# Patient Record
Sex: FEMALE | Race: WHITE | Employment: OTHER | URBAN - METROPOLITAN AREA
[De-identification: names, ages, dates, MRNs, and addresses within clinical notes are randomized per-mention and may not be internally consistent; named-entity substitution may affect disease eponyms.]

---

## 2017-01-17 PROBLEM — R03.0 ELEVATED BLOOD PRESSURE READING WITHOUT DIAGNOSIS OF HYPERTENSION: Status: ACTIVE | Noted: 2017-01-17

## 2017-06-15 PROBLEM — I10 ESSENTIAL HYPERTENSION: Status: ACTIVE | Noted: 2017-01-17

## 2017-06-15 PROBLEM — R01.1 SYSTOLIC MURMUR: Status: ACTIVE | Noted: 2017-06-15

## 2017-07-27 PROBLEM — I51.7 LAE (LEFT ATRIAL ENLARGEMENT): Status: ACTIVE | Noted: 2017-07-27

## 2017-07-27 PROBLEM — I34.81 MITRAL VALVE ANNULAR CALCIFICATION: Status: ACTIVE | Noted: 2017-07-27

## 2017-09-08 ENCOUNTER — HOSPITAL ENCOUNTER (OUTPATIENT)
Dept: LAB | Age: 82
Discharge: HOME | End: 2017-09-08
Payer: MEDICARE

## 2017-09-08 DIAGNOSIS — I05.0 SEVERE MITRAL VALVE STENOSIS: ICD-10-CM

## 2017-09-08 DIAGNOSIS — I34.81 MITRAL VALVE ANNULAR CALCIFICATION: ICD-10-CM

## 2017-09-08 DIAGNOSIS — I10 ESSENTIAL HYPERTENSION: ICD-10-CM

## 2017-09-08 DIAGNOSIS — I51.7 LAE (LEFT ATRIAL ENLARGEMENT): ICD-10-CM

## 2017-09-08 LAB
ALBUMIN SERPL-MCNC: 4 G/DL (ref 3.2–4.6)
ALBUMIN/GLOB SERPL: 1.1 {RATIO}
ALP SERPL-CCNC: 58 U/L (ref 50–136)
ALT SERPL-CCNC: 16 U/L (ref 12–65)
ANION GAP SERPL CALC-SCNC: 7 MMOL/L
AST SERPL-CCNC: 16 U/L (ref 15–37)
BASOPHILS # BLD: 0 K/UL (ref 0–0.2)
BASOPHILS NFR BLD: 0 % (ref 0–2)
BILIRUB SERPL-MCNC: 0.4 MG/DL (ref 0.2–1.1)
BUN SERPL-MCNC: 42 MG/DL (ref 8–23)
CALCIUM SERPL-MCNC: 9.2 MG/DL (ref 8.3–10.4)
CHLORIDE SERPL-SCNC: 105 MMOL/L (ref 98–107)
CHOLEST SERPL-MCNC: 216 MG/DL
CO2 SERPL-SCNC: 28 MMOL/L (ref 21–32)
CREAT SERPL-MCNC: 1.5 MG/DL (ref 0.6–1)
DIFFERENTIAL METHOD BLD: ABNORMAL
EOSINOPHIL # BLD: 0.2 K/UL (ref 0–0.8)
EOSINOPHIL NFR BLD: 2 % (ref 0.5–7.8)
ERYTHROCYTE [DISTWIDTH] IN BLOOD BY AUTOMATED COUNT: 13 % (ref 11.9–14.6)
GLOBULIN SER CALC-MCNC: 3.7 G/DL
GLUCOSE SERPL-MCNC: 112 MG/DL (ref 65–100)
HCT VFR BLD AUTO: 36 % (ref 35.8–46.3)
HDLC SERPL-MCNC: 51 MG/DL (ref 40–60)
HDLC SERPL: 4.2 {RATIO}
HGB BLD-MCNC: 11.8 G/DL (ref 11.7–15.4)
LDLC SERPL CALC-MCNC: 132.6 MG/DL
LIPID PROFILE,FLP: ABNORMAL
LYMPHOCYTES # BLD: 1.3 K/UL (ref 0.5–4.6)
LYMPHOCYTES NFR BLD: 16 % (ref 13–44)
MCH RBC QN AUTO: 31.9 PG (ref 26.1–32.9)
MCHC RBC AUTO-ENTMCNC: 32.8 G/DL (ref 31.4–35)
MCV RBC AUTO: 97.3 FL (ref 79.6–97.8)
MONOCYTES # BLD: 0.7 K/UL (ref 0.1–1.3)
MONOCYTES NFR BLD: 9 % (ref 4–12)
NEUTS SEG # BLD: 6.1 K/UL (ref 1.7–8.2)
NEUTS SEG NFR BLD: 73 % (ref 43–78)
PLATELET # BLD AUTO: 230 K/UL (ref 150–450)
PMV BLD AUTO: 8.9 FL (ref 10.8–14.1)
POTASSIUM SERPL-SCNC: 4.7 MMOL/L (ref 3.5–5.1)
PROT SERPL-MCNC: 7.7 G/DL (ref 6.3–8.2)
RBC # BLD AUTO: 3.7 M/UL (ref 4.05–5.25)
SODIUM SERPL-SCNC: 140 MMOL/L (ref 136–145)
TRIGL SERPL-MCNC: 162 MG/DL (ref 35–150)
TSH SERPL DL<=0.005 MIU/L-ACNC: 2.87 UIU/ML (ref 0.36–3.74)
VLDLC SERPL CALC-MCNC: 32.4 MG/DL (ref 6–23)
WBC # BLD AUTO: 8.3 K/UL (ref 4.3–11.1)

## 2017-09-08 PROCEDURE — 84443 ASSAY THYROID STIM HORMONE: CPT | Performed by: INTERNAL MEDICINE

## 2017-09-08 PROCEDURE — 36415 COLL VENOUS BLD VENIPUNCTURE: CPT | Performed by: INTERNAL MEDICINE

## 2017-09-08 PROCEDURE — 85025 COMPLETE CBC W/AUTO DIFF WBC: CPT | Performed by: INTERNAL MEDICINE

## 2017-09-08 PROCEDURE — 80061 LIPID PANEL: CPT | Performed by: INTERNAL MEDICINE

## 2017-09-08 PROCEDURE — 80053 COMPREHEN METABOLIC PANEL: CPT | Performed by: INTERNAL MEDICINE

## 2017-09-14 NOTE — PROGRESS NOTES
Patient pre-assessment complete for [facility] with Dr Vicky Savage scheduled for 9/15/17 at 9:30am, arrival time 6:30am - HYDRATION. Patient verified using . Patient instructed to bring all home medications in labeled bottles on the day of procedure. NPO status reinforced. Patient informed to take a full dose aspirin 325mg  or 81 mg x 4 on the day of procedure. Patient instructed to HOLD lisinopril/HCTZ. Instructed they can take all other medications excluding vitamins & supplements. Patient verbalizes understanding of all instructions & denies any questions at this time.

## 2017-09-15 ENCOUNTER — HOSPITAL ENCOUNTER (OUTPATIENT)
Dept: CARDIAC CATH/INVASIVE PROCEDURES | Age: 82
Discharge: HOME | End: 2017-09-15
Attending: INTERNAL MEDICINE | Admitting: INTERNAL MEDICINE
Payer: MEDICARE

## 2017-09-15 VITALS
RESPIRATION RATE: 26 BRPM | BODY MASS INDEX: 29.59 KG/M2 | OXYGEN SATURATION: 98 % | WEIGHT: 167 LBS | DIASTOLIC BLOOD PRESSURE: 94 MMHG | SYSTOLIC BLOOD PRESSURE: 161 MMHG | HEART RATE: 62 BPM | HEIGHT: 63 IN

## 2017-09-15 LAB
ANION GAP SERPL CALC-SCNC: 9 MMOL/L (ref 7–16)
BUN SERPL-MCNC: 31 MG/DL (ref 8–23)
CALCIUM SERPL-MCNC: 9.4 MG/DL (ref 8.3–10.4)
CHLORIDE SERPL-SCNC: 107 MMOL/L (ref 98–107)
CO2 SERPL-SCNC: 24 MMOL/L (ref 21–32)
CREAT SERPL-MCNC: 1.17 MG/DL (ref 0.6–1)
GLUCOSE SERPL-MCNC: 107 MG/DL (ref 65–100)
POTASSIUM SERPL-SCNC: 4.3 MMOL/L (ref 3.5–5.1)
SODIUM SERPL-SCNC: 140 MMOL/L (ref 136–145)

## 2017-09-15 PROCEDURE — 74011000250 HC RX REV CODE- 250: Performed by: INTERNAL MEDICINE

## 2017-09-15 PROCEDURE — 74011636320 HC RX REV CODE- 636/320: Performed by: INTERNAL MEDICINE

## 2017-09-15 PROCEDURE — 77030004534 HC CATH ANGI DX INFN CARD -A

## 2017-09-15 PROCEDURE — 74011250636 HC RX REV CODE- 250/636

## 2017-09-15 PROCEDURE — 77030015766

## 2017-09-15 PROCEDURE — 99153 MOD SED SAME PHYS/QHP EA: CPT

## 2017-09-15 PROCEDURE — C1894 INTRO/SHEATH, NON-LASER: HCPCS

## 2017-09-15 PROCEDURE — 80048 BASIC METABOLIC PNL TOTAL CA: CPT | Performed by: INTERNAL MEDICINE

## 2017-09-15 PROCEDURE — C1769 GUIDE WIRE: HCPCS

## 2017-09-15 PROCEDURE — 99152 MOD SED SAME PHYS/QHP 5/>YRS: CPT

## 2017-09-15 PROCEDURE — 74011250636 HC RX REV CODE- 250/636: Performed by: INTERNAL MEDICINE

## 2017-09-15 PROCEDURE — C1751 CATH, INF, PER/CENT/MIDLINE: HCPCS

## 2017-09-15 PROCEDURE — 74011000258 HC RX REV CODE- 258: Performed by: INTERNAL MEDICINE

## 2017-09-15 PROCEDURE — 77030029997 HC DEV COM RDL R BND TELE -B

## 2017-09-15 PROCEDURE — 93460 R&L HRT ART/VENTRICLE ANGIO: CPT

## 2017-09-15 RX ORDER — DIAZEPAM 5 MG/1
5 TABLET ORAL ONCE
Status: DISCONTINUED | OUTPATIENT
Start: 2017-09-15 | End: 2017-09-15 | Stop reason: HOSPADM

## 2017-09-15 RX ORDER — GUAIFENESIN 100 MG/5ML
81-324 LIQUID (ML) ORAL ONCE
Status: DISCONTINUED | OUTPATIENT
Start: 2017-09-15 | End: 2017-09-15 | Stop reason: HOSPADM

## 2017-09-15 RX ORDER — SODIUM CHLORIDE 9 MG/ML
1 INJECTION, SOLUTION INTRAVENOUS AS NEEDED
Status: DISCONTINUED | OUTPATIENT
Start: 2017-09-15 | End: 2017-09-15 | Stop reason: HOSPADM

## 2017-09-15 RX ORDER — LIDOCAINE HYDROCHLORIDE 20 MG/ML
1-40 INJECTION, SOLUTION INFILTRATION; PERINEURAL
Status: DISCONTINUED | OUTPATIENT
Start: 2017-09-15 | End: 2017-09-15 | Stop reason: HOSPADM

## 2017-09-15 RX ORDER — FENTANYL CITRATE 50 UG/ML
25-200 INJECTION, SOLUTION INTRAMUSCULAR; INTRAVENOUS
Status: DISCONTINUED | OUTPATIENT
Start: 2017-09-15 | End: 2017-09-15 | Stop reason: HOSPADM

## 2017-09-15 RX ORDER — SODIUM CHLORIDE 9 MG/ML
3 INJECTION, SOLUTION INTRAVENOUS ONCE
Status: COMPLETED | OUTPATIENT
Start: 2017-09-15 | End: 2017-09-15

## 2017-09-15 RX ORDER — MIDAZOLAM HYDROCHLORIDE 1 MG/ML
.5-5 INJECTION, SOLUTION INTRAMUSCULAR; INTRAVENOUS
Status: DISCONTINUED | OUTPATIENT
Start: 2017-09-15 | End: 2017-09-15 | Stop reason: HOSPADM

## 2017-09-15 RX ORDER — SODIUM CHLORIDE 9 MG/ML
75 INJECTION, SOLUTION INTRAVENOUS CONTINUOUS
Status: DISCONTINUED | OUTPATIENT
Start: 2017-09-15 | End: 2017-09-15

## 2017-09-15 RX ORDER — HEPARIN SODIUM 200 [USP'U]/100ML
3 INJECTION, SOLUTION INTRAVENOUS CONTINUOUS
Status: DISCONTINUED | OUTPATIENT
Start: 2017-09-15 | End: 2017-09-15 | Stop reason: HOSPADM

## 2017-09-15 RX ADMIN — LIDOCAINE HYDROCHLORIDE 100 MG: 20 INJECTION, SOLUTION INFILTRATION; PERINEURAL at 09:48

## 2017-09-15 RX ADMIN — FENTANYL CITRATE 25 MCG: 50 INJECTION, SOLUTION INTRAMUSCULAR; INTRAVENOUS at 10:38

## 2017-09-15 RX ADMIN — MIDAZOLAM HYDROCHLORIDE 1 MG: 1 INJECTION, SOLUTION INTRAMUSCULAR; INTRAVENOUS at 09:55

## 2017-09-15 RX ADMIN — FENTANYL CITRATE 25 MCG: 50 INJECTION, SOLUTION INTRAMUSCULAR; INTRAVENOUS at 09:55

## 2017-09-15 RX ADMIN — IOPAMIDOL 90 ML: 755 INJECTION, SOLUTION INTRAVENOUS at 10:54

## 2017-09-15 RX ADMIN — MIDAZOLAM HYDROCHLORIDE 1 MG: 1 INJECTION, SOLUTION INTRAMUSCULAR; INTRAVENOUS at 10:38

## 2017-09-15 RX ADMIN — VERAPAMIL HYDROCHLORIDE 2 ML: 2.5 INJECTION INTRAVENOUS at 09:48

## 2017-09-15 RX ADMIN — HEPARIN SODIUM 3 ML/HR: 200 INJECTION, SOLUTION INTRAVENOUS at 10:00

## 2017-09-15 RX ADMIN — SODIUM CHLORIDE 3 ML/KG/HR: 900 INJECTION, SOLUTION INTRAVENOUS at 08:00

## 2017-09-15 NOTE — IP AVS SNAPSHOT
[address] 
[phone] Patient: Jeremiah Garcia MRN: [id number] KRT:8[birth year] Discharge Summary 9/15/2017 Jeremiah Garcia MRN[de-identified]  [id number] Admission Information Provider Pager Service Admission Date Expected D/C Date Jade Barber MD  CARDIAC CATH LAB 9/15/2017 Actual LOS Patient Class 0 days OUTPATIENT Follow-up Information None Current Discharge Medication List  
  
ASK your doctor about these medications Dose & Instructions Dispensing Information Comments Morning Noon Evening Bedtime CALCIUM 600 WITH VITAMIN D3 600 mg(1,500mg) -400 unit Cap Generic drug:  Calcium-Cholecalciferol (D3) Your last dose was: Your next dose is:    
   
   
 Dose:  1 Cap Take 1 Cap by mouth two (2) times a day. Refills:  0  
     
   
   
   
  
 colestipol 1 gram tablet Commonly known as:  COLESTID Your last dose was: Your next dose is:    
   
   
 Dose:  1 g Take 1 Tab by mouth daily. \"NAME ALERT\" Quantity:  90 Tab Refills:  3 FISH OIL PO Your last dose was: Your next dose is: Take  by mouth daily. Refills:  0 FORMULA MULTIVIT & MINERAL PO Your last dose was: Your next dose is: Take  by mouth daily. Refills:  0  
     
   
   
   
  
 garlic Cap Your last dose was: Your next dose is: Take  by mouth daily. Refills:  0  
     
   
   
   
  
 levothyroxine 75 mcg tablet Commonly known as:  SYNTHROID Your last dose was: Your next dose is:    
   
   
 Dose:  75 mcg Take 1 Tab by mouth daily. Generic OK, \"NAME ALERT\"  Indications: hypothyroidism Quantity:  90 Tab Refills:  3  
     
   
   
   
  
 lisinopril-hydroCHLOROthiazide 20-12.5 mg per tablet Commonly known as:  Rocky Goss Your last dose was: Your next dose is:    
   
   
 Dose:  1 Tab Take 1 Tab by mouth daily. Quantity:  90 Tab Refills:  1  
 +++NAME ALERT+++ OTHER Your last dose was: Your next dose is: OTC memory tablet- qd Refills:  0  
     
   
   
   
  
 TYLENOL 325 mg tablet Generic drug:  acetaminophen Your last dose was: Your next dose is:    
   
   
 Dose:  650 mg  
take 650 mg by mouth every four (4) hours as needed for Pain. Refills:  0  
     
   
   
   
  
 VITAMIN B-12 500 mcg tablet Generic drug:  cyanocobalamin Your last dose was: Your next dose is:    
   
   
 Dose:  500 mcg  
take 500 mcg by mouth daily. Refills:  0 General Information Please provide this summary of care documentation to your next provider. Allergies Unspecified:  Lipitor [Atorvastatin]; Penicillin G  
  
Current Immunizations  Reviewed on 10/27/2016 Name Date Influenza High Dose Vaccine PF 10/27/2016, 10/16/2015 Pneumococcal Conjugate (PCV-13) 10/27/2016 Discharge Instructions Discharge Instructions Appointment with Dr. Catia Sorto on Tuesday September 19th at 3:20 pm at [address] at [address] [facility] [address] 120. HEART CATHETERIZATION/ANGIOGRAPHY DISCHARGE INSTRUCTIONS 1. Check puncture site frequently for swelling or bleeding. If there is any bleeding, lie down and apply pressure over the area with a clean towel or washcloth and call 911. Notify your doctor for any redness, swelling, drainage, or oozing from the puncture site. Notify your doctor for any fever or chills. 2. If the extremity becomes cold, numb, or painful call Dr. Krystal Kam at ***. 3. Activity should be limited for the next 48 hours. Avoid pushing, pulling and bending of affected wrist for 48 hours.  No heavy lifting (anything over 5 pounds) for 3 days. No driving for 48 hours. 4. You may resume your usual diet. Drink more fluids than usual. 
5. Have a responsible person drive you home and stay with you for at least 24 hours after your heart catheterization/angiography. 6. You may remove bandage from your *** arm  in 24 hours. You may shower in 24 hours. No tub baths, hot tubs, or swimming for 1 week. Do not place any lotions, creams, powders, or ointments over puncture site for 1 week. You may place a clean band-aid over the puncture site each day for 5 days. Change daily. I have read the above instructions and have had the opportunity to ask questions. Discharge Orders None  
  
` Patient Signature:  ____________________________________________________________ Date:  ____________________________________________________________  
  
 Fayrene Retort Provider Signature:  ____________________________________________________________ Date:  ____________________________________________________________

## 2017-09-15 NOTE — PROGRESS NOTES
Patient received to [address] [place] [address] room # 6  Ambulatory from [facility]. Patient scheduled for [facility] today with Dr Sara rAnold. Procedure reviewed & questions answered, voiced good understanding consent obtained & placed on chart. All medications and medical history reviewed. Will prep patient per orders. Patient & family updated on plan of care.

## 2017-09-15 NOTE — PROCEDURES
Cardiac Catheterization Procedure Note    Patient ID:     Name: Bartolome Vences   Medical Record Number: [id number]   YOB: 1935    Date of Procedure: 9/15/2017    Physician: Vincent Roberson MD    [facility]    Indications: This is a 80 yrs female who presents with MS. Blood loss less than 5 ml    Sedation. Pt received 1 mg versed and 25 mcg fentanyl for monitored conscious sedation in 2 15 minute intervals. Specimen: None    No complications    No assistants    Time out, Mallampati, and ASA performed    Procedure:  After informed consent, patient was prepped and draped in the usual sterile fashion. The right wrist was infiltrated with lidocaine. The right radial artery was accessed via the modified Seldinger technique with a 6 Malay sheath. 100cc Visipaque contrast were utilized for the entire procedure. no closure device used    Catheters.       FINDINGS    Left Ventricle: 65%  LVEDP: 15    Left Main:calcified    Left Anterior descending coronary artery: 95% mid severe stenosis    Left Circumflex coronary artery: ok    Right coronary artery: 100%prox occlusion      Graft anatomy: na    Intervention if done: na    Conclusions: severe cad with severe mitral stenosis valve are 1.4 mean gradient 9.45    Recommentations: cabg mvr    No complications      Signed By: Vincent Roberson MD

## 2017-09-15 NOTE — PROGRESS NOTES
Patient up to bedside, vital signs and site stable. Patient ambulated to bathroom without difficulty. Patient voided without difficulty. Vascular site stable. 1335 Discharge instructions and home medications reviewed with patient. Time allowed for questions and answers. 1340 Patient ambulated second time without difficulty. Site stable after ambulation. Peripheral IV sites dc'd without difficulty with tips intact. 1345 Patient discharged to home with family.

## 2017-09-15 NOTE — PROCEDURES
Lien Posey 44       Name:  Shasha Lyle   MR#:  [id number]   :  1935   Account #:  [de-identified]   Date of Adm:  09/15/2017       PROCEDURE: Right and left heart catheterization with dual   transducers for mitral stenosis. INDICATIONS: Mitral stenosis, shortness of breath, angina. COMPLICATIONS: None. ACCESS: Right radial and right brachial access were used, 1 for   the artery, 1 for the vein. A tig4 and pigtail were used. Mardeen Bronx   was used. FINDINGS ARE AS FOLLOWS:    HEMODYNAMICS: Right atrial A wave of 11, V wave 8, mean of 7. RA   pressure systolic 43, diastolic 3, mean of 7, PA pressure   systolic 44, diastolic 16, mean of 24. Pulmonary capillary wedge pressure A wave 17, V-wave 23, mean of   16. LV pressure 133/8 with an LVEDP of 10. Aortic pressure 132/52. Cardiac output 5.85. Calculated mitral valve area is 1.4 cm2 with a mean gradient of   9.45 mmHg. FINDINGS: Left ventriculogram done in ROWE projection shows EF   60% to 65%. No gradient on pullback. The left main arises normally, bifurcates into an LAD and   circumflex. The left main is calcified, but not stenosed. The LAD courses to the apex. The proximal LAD is severely   diseased with 90% to 95% stenosis followed by post-stenotic   aneurysmal dilatation, then the LAD becomes a normal caliber   with no significant disease to the apex. The circumflex artery in the AV groove, supplies 2 OMs, it   appears to be smooth and relatively spared of any disease. The right coronary artery arises in the normal fashion and has a   chronic occlusion in the proximal third of the artery. The right   fills with right to right collaterals. CONCLUSIONS: Severe multivessel coronary artery disease with   preserved left ventricular systolic function. Severe mitral   annular calcification with severe mitral stenosis and a valve   area of approximately 1.4 cm.  The patient has a mean gradient of   9.45, a Peak gradient of between 25 and 27 mmHg. Consideration   for valve CABG will be given.         MD CIRO Rojo / Helder Reid   D:  09/15/2017   11:32   T:  09/15/2017   11:46   Job #:  [id number]

## 2017-09-15 NOTE — PROGRESS NOTES
Report received from [address]. Procedural findings communicated. Intra procedural  medication administration reviewed. Progression of care discussed. Patient received into [address] [facility] 6 post procedure.      Sheath site without bleeding or swelling yes    Dressing dry and intact yes    Patient instructed to limit movement to right upper extremity    Routine post procedural vital signs and site assessment initiated yes

## 2017-09-15 NOTE — DISCHARGE INSTRUCTIONS
Appointment with Dr. Annabelle Reyes on Tuesday September 19th at 3:20 pm at [address] at [address] [place] [address]. HEART CATHETERIZATION/ANGIOGRAPHY DISCHARGE INSTRUCTIONS    1. Check puncture site frequently for swelling or bleeding. If there is any bleeding, lie down and apply pressure over the area with a clean towel or washcloth and call 911. Notify your doctor for any redness, swelling, drainage, or oozing from the puncture site. Notify your doctor for any fever or chills. 2. If the extremity becomes cold, numb, or painful call Dr. Birt Dakins at ***. 3. Activity should be limited for the next 48 hours. Avoid pushing, pulling and bending of affected wrist for 48 hours. No heavy lifting (anything over 5 pounds) for 3 days. No driving for 48 hours. 4. You may resume your usual diet. Drink more fluids than usual.  5. Have a responsible person drive you home and stay with you for at least 24 hours after your heart catheterization/angiography. 6. You may remove bandage from your *** arm  in 24 hours. You may shower in 24 hours. No tub baths, hot tubs, or swimming for 1 week. Do not place any lotions, creams, powders, or ointments over puncture site for 1 week. You may place a clean band-aid over the puncture site each day for 5 days. Change daily. I have read the above instructions and have had the opportunity to ask questions.

## 2017-09-15 NOTE — H&P
Progress Notes  Encounter Date: 9/8/2017  Michael Estrella MD   Cardiology   Expand All Collapse All    []Hide copied text  []Hover for attribution information  [address] [place], [place]  [address] [place] [address], [address] [place] [address]  PHONE: [phone]     SUBJECTIVE: Amilcar Garcia (1935) is a 80 y.o. female seen for a follow up visit regarding the following:           ICD-10-CM ICD-9-CM   1. Essential hypertension I10 401.9   2. LAE (left atrial enlargement)-Severe I51.7 429.3   3. Mitral valve annular calcification I05.9 424.0   4. Severe mitral valve stenosis I05.0 394.0      Pt here for new murmur. She is sob jack getting worse class 2 symptoms. No pnd. Some mild cp. Not exertional related. Echo shows severe mitral stenosis        Past Medical History, Past Surgical History, Family history, Social History, and Medications were all reviewed with the patient today and updated as necessary.            Outpatient Prescriptions Marked as Taking for the 9/8/17 encounter (Office Visit) with Michael Estrella MD   Medication Sig Dispense Refill    Calcium-Cholecalciferol, D3, (CALCIUM 600 WITH VITAMIN D3) 600 mg(1,500mg) -400 unit cap Take 1 Cap by mouth two (2) times a day.        OTHER OTC memory tablet- qd        lisinopril-hydroCHLOROthiazide (PRINZIDE, ZESTORETIC) 20-12.5 mg per tablet Take 1 Tab by mouth daily. 90 Tab 1    levothyroxine (SYNTHROID) 75 mcg tablet Take 1 Tab by mouth daily. Generic OK, \"NAME ALERT\"  Indications: hypothyroidism 90 Tab 3    colestipol (COLESTID) 1 gram tablet Take 1 Tab by mouth daily.  \"NAME ALERT\" 90 Tab 3    TYLENOL 325 mg Tab take 650 mg by mouth every four (4) hours as needed for Pain.         garlic Cap take by mouth.         FISH OIL PO take by mouth.         VITAMIN B-12 500 mcg Tab take 500 mcg by mouth daily.                 Allergies   Allergen Reactions    Lipitor [Atorvastatin] Other (comments)       Muscle aches    Penicillin G Rash           Past Medical History:   Diagnosis Date    Endocrine disease       hypothyroid    Gastrointestinal disorder       hiatal hernia    Osteoarthrosis, unspecified whether generalized or localized, shoulder region 7/21/2015            Past Surgical History:   Procedure Laterality Date    ABDOMEN SURGERY PROC UNLISTED         cystocele/ rectocele    HX GYN         hysterectomy    HX HEENT         yolanda.  cataract    HX ORTHOPAEDIC         right ankle pin             Family History   Problem Relation Age of Onset    Cancer Mother         breast    Cancer Father         lung           Social History   Substance Use Topics    Smoking status: Never Smoker    Smokeless tobacco: Never Used    Alcohol use No         ROS:  Review of Systems - General ROS: negative for - chills, fatigue or fever  Hematological and Lymphatic ROS: negative for - bleeding problems, bruising or jaundice  Respiratory ROS: positive for - shortness of breath  Cardiovascular ROS: positive for - chest pain  Gastrointestinal ROS: no abdominal pain, change in bowel habits, or black or bloody stools  Neurological ROS: no TIA or stroke symptoms  All other systems negative.        PHYSICAL EXAM:       Visit Vitals    /60    Pulse 64    Ht 5' 3\" (1.6 m)    Wt 167 lb (75.8 kg)    BMI 29.58 kg/m2         Physical Examination: General appearance - alert, well appearing, and in no distress  Mental status - alert, oriented to person, place, and time  Eyes - pupils equal and reactive, extraocular eye movements intact  Neck/lymph - supple, no significant adenopathy  Chest/lungs - clear to auscultation, no wheezes, rales or rhonchi, symmetric air entry  Heart/CV - normal rate, regular rhythm, normal S1, S2, no murmurs, rubs, clicks or gallops, diastolic murmur 2/6 at 2nd left intercostal space  Abdomen/GI - soft, nontender, nondistended, no masses or organomegaly  Musculoskeletal - no joint tenderness, deformity or swelling  Extremities - peripheral pulses normal, no pedal edema, no clubbing or cyanosis  Skin - normal coloration and turgor, no rashes, no suspicious skin lesions noted     EKG review nsr      Recent Results    No results found for this or any previous visit (from the past 672 hour(s)). Lab Results   Component Value Date/Time     Cholesterol, total 196 05/18/2017 09:19 AM     HDL Cholesterol 55 05/18/2017 09:19 AM     LDL, calculated 123 05/18/2017 09:19 AM     VLDL, calculated 18 05/18/2017 09:19 AM     Triglyceride 88 05/18/2017 09:19 AM            ASSESSMENT and PLAN           1. Essential hypertension  The current medical regimen is effective;  continue present plan and medications.     - AMB POC EKG ROUTINE W/ 12 LEADS, INTER & REP     2. LAE (left atrial enlargement)-Severe  The current medical regimen is effective;  continue present plan and medications.        3. Mitral valve annular calcification  The current medical regimen is effective;  continue present plan and medications.        4. Severe mitral valve stenosis  The current medical regimen is effective;  continue present plan and medications.                       Orders Placed This Encounter    AMB POC EKG ROUTINE W/ 12 LEADS, INTER & REP       Order Specific Question:   Reason for Exam:       Answer:   See diagnosis    Calcium-Cholecalciferol, D3, (CALCIUM 600 WITH VITAMIN D3) 600 mg(1,500mg) -400 unit cap       Sig: Take 1 Cap by mouth two (2) times a day.     OTHER       Sig: OTC memory tablet- qd         Follow-up Disposition: Not on File     Needs heart cath prob dual transducer for mitral stenosis        Tab Pollard MD  9/8/2017  9:36 AM            Electronically signed by Tab Pollard MD at 09/08/17 2235        Office Visit on 9/8/2017              Routing History              Detailed Report             Note shared with patient   Note Details   Author Tab Pollard MD File Time 09/08/17 5786   Author Type Physician Status Signed   Last  Tab Pollard MD Specialty Cardiology

## 2017-09-15 NOTE — PROGRESS NOTES
7FR sheath removed from right brachial vein. Manual pressure held for 15 minutes until hemostasis achieved. No bleeding or hematoma noted afterwards. Sterile dressing placed. Pt instructed to keep right arm straight and to remain flat. Pt verbalized understanding of post procedural instructions. Call bell within reach. Will continue to monitor. Hemostasis achieved at 1135.

## 2017-09-15 NOTE — PROGRESS NOTES
TRANSFER - OUT REPORT:    Verbal report given to Vladimir Harris RN(name) on Jose Francisco Garcia  being transferred to CPRU(unit) for routine progression of care       Report consisted of patients Situation, Background, Assessment and   Recommendations(SBAR). Information from the following report(s) SBAR and Procedure Summary was reviewed with the receiving nurse. Opportunity for questions and clarification was provided. Procedure: LHC/RHC   Finding Summary: Diagnostic, surgical consult(cath/pci/pacer settings)  Location: RRA/RBV    Closure Device: RRA with R band, 12 ml of air at 1058, RBV with 7fr sheath in place(yes/no/description)  Post Site Assessment: no oozing or hematoma, verified with Fareed George RN     Pre Procedure Meds:(if any)    ASA: 324 mg  When Received:  1936  Antiplatelet: n/a    Intra Procedure Meds:    Versed: 2 mg  Fentanyl: 50 mcg  Heparin: 2000 units               Peripheral IV 09/15/17 Right Antecubital (Active)       Peripheral IV 09/15/17 Left Antecubital (Active)     Sheath 09/15/17 (Active)   Site Assessment Clean, dry, & intact 9/15/2017 10:57 AM   Dressing Status Occlusive 9/15/2017 10:57 AM   Dressing Type Transparent 9/15/2017 10:57 AM   Hub Color/Line Status Orange 9/15/2017 10:57 AM     Post-Procedure Site Assessment (1)  Wound Type: Catheter entry/exit  Location: Radial  Orientation : Right  Closure Device:  (R band with 10 ml of air at 1058)  Site Assessment: Dry/intact                       is allergic to lipitor [atorvastatin] and penicillin g.     Past Medical History:   Diagnosis Date    Endocrine disease     hypothyroid    Essential hypertension     Gastrointestinal disorder     hiatal hernia    Osteoarthrosis, unspecified whether generalized or localized, shoulder region 7/21/2015     Visit Vitals    /76    Pulse 67    Resp 18    Ht 5' 3\" (1.6 m)    Wt 75.8 kg (167 lb)    SpO2 98%    Breastfeeding No    BMI 29.58 kg/m2

## 2017-09-21 NOTE — PROGRESS NOTES
Patient pre-assessment complete for [facility] with DR Reyes Francis scheduled for 17 at 9am, arrival time 7am. Patient verified using . Patient instructed to bring all home medications in labeled bottles on the day of procedure. NPO status reinforced. Patient instructed to HOLD lisinopril/hctz. Instructed they can take all other medications excluding vitamins & supplements. Patient verbalizes understanding of all instructions & denies any questions at this time.

## 2017-09-22 ENCOUNTER — HOSPITAL ENCOUNTER (OUTPATIENT)
Dept: CARDIAC CATH/INVASIVE PROCEDURES | Age: 82
Discharge: HOME | End: 2017-09-22
Attending: INTERNAL MEDICINE | Admitting: INTERNAL MEDICINE
Payer: MEDICARE

## 2017-09-22 VITALS
HEART RATE: 57 BPM | OXYGEN SATURATION: 90 % | RESPIRATION RATE: 16 BRPM | BODY MASS INDEX: 29.59 KG/M2 | TEMPERATURE: 98.6 F | DIASTOLIC BLOOD PRESSURE: 75 MMHG | WEIGHT: 167 LBS | HEIGHT: 63 IN | SYSTOLIC BLOOD PRESSURE: 162 MMHG

## 2017-09-22 LAB
ANION GAP SERPL CALC-SCNC: 10 MMOL/L (ref 7–16)
ATRIAL RATE: 62 BPM
BUN SERPL-MCNC: 29 MG/DL (ref 8–23)
CALCIUM SERPL-MCNC: 9.4 MG/DL (ref 8.3–10.4)
CALCULATED P AXIS, ECG09: 95 DEGREES
CALCULATED R AXIS, ECG10: 45 DEGREES
CALCULATED T AXIS, ECG11: 52 DEGREES
CHLORIDE SERPL-SCNC: 107 MMOL/L (ref 98–107)
CO2 SERPL-SCNC: 25 MMOL/L (ref 21–32)
CREAT SERPL-MCNC: 1.02 MG/DL (ref 0.6–1)
DIAGNOSIS, 93000: NORMAL
ERYTHROCYTE [DISTWIDTH] IN BLOOD BY AUTOMATED COUNT: 13.1 % (ref 11.9–14.6)
GLUCOSE SERPL-MCNC: 115 MG/DL (ref 65–100)
HCT VFR BLD AUTO: 34 % (ref 35.8–46.3)
HGB BLD-MCNC: 11.4 G/DL (ref 11.7–15.4)
INR PPP: 1 (ref 0.9–1.2)
MCH RBC QN AUTO: 31.1 PG (ref 26.1–32.9)
MCHC RBC AUTO-ENTMCNC: 33.5 G/DL (ref 31.4–35)
MCV RBC AUTO: 92.9 FL (ref 79.6–97.8)
P-R INTERVAL, ECG05: 196 MS
PLATELET # BLD AUTO: 218 K/UL (ref 150–450)
PMV BLD AUTO: 9.1 FL (ref 10.8–14.1)
POTASSIUM SERPL-SCNC: 4.2 MMOL/L (ref 3.5–5.1)
PROTHROMBIN TIME: 10.5 SEC (ref 9.6–12)
Q-T INTERVAL, ECG07: 422 MS
QRS DURATION, ECG06: 106 MS
QTC CALCULATION (BEZET), ECG08: 428 MS
RBC # BLD AUTO: 3.66 M/UL (ref 4.05–5.25)
SODIUM SERPL-SCNC: 142 MMOL/L (ref 136–145)
VENTRICULAR RATE, ECG03: 62 BPM
WBC # BLD AUTO: 8.8 K/UL (ref 4.3–11.1)

## 2017-09-22 PROCEDURE — 99152 MOD SED SAME PHYS/QHP 5/>YRS: CPT

## 2017-09-22 PROCEDURE — 74011000250 HC RX REV CODE- 250: Performed by: INTERNAL MEDICINE

## 2017-09-22 PROCEDURE — 80048 BASIC METABOLIC PNL TOTAL CA: CPT | Performed by: INTERNAL MEDICINE

## 2017-09-22 PROCEDURE — 93005 ELECTROCARDIOGRAM TRACING: CPT | Performed by: INTERNAL MEDICINE

## 2017-09-22 PROCEDURE — 85027 COMPLETE CBC AUTOMATED: CPT | Performed by: INTERNAL MEDICINE

## 2017-09-22 PROCEDURE — 85610 PROTHROMBIN TIME: CPT | Performed by: INTERNAL MEDICINE

## 2017-09-22 PROCEDURE — 74011250636 HC RX REV CODE- 250/636

## 2017-09-22 PROCEDURE — 93312 ECHO TRANSESOPHAGEAL: CPT

## 2017-09-22 RX ORDER — FENTANYL CITRATE 50 UG/ML
25-50 INJECTION, SOLUTION INTRAMUSCULAR; INTRAVENOUS
Status: DISCONTINUED | OUTPATIENT
Start: 2017-09-22 | End: 2017-09-22 | Stop reason: HOSPADM

## 2017-09-22 RX ORDER — LIDOCAINE HYDROCHLORIDE 20 MG/ML
15 SOLUTION OROPHARYNGEAL AS NEEDED
Status: DISCONTINUED | OUTPATIENT
Start: 2017-09-22 | End: 2017-09-22 | Stop reason: HOSPADM

## 2017-09-22 RX ORDER — MIDAZOLAM HYDROCHLORIDE 1 MG/ML
.5-5 INJECTION, SOLUTION INTRAMUSCULAR; INTRAVENOUS
Status: DISCONTINUED | OUTPATIENT
Start: 2017-09-22 | End: 2017-09-22 | Stop reason: HOSPADM

## 2017-09-22 RX ORDER — SODIUM CHLORIDE 9 MG/ML
75 INJECTION, SOLUTION INTRAVENOUS CONTINUOUS
Status: DISCONTINUED | OUTPATIENT
Start: 2017-09-22 | End: 2017-09-22 | Stop reason: HOSPADM

## 2017-09-22 RX ADMIN — LIDOCAINE HYDROCHLORIDE 15 ML: 20 SOLUTION ORAL; TOPICAL at 11:31

## 2017-09-22 NOTE — PROGRESS NOTES
Discharge instructions reviewed with patient and daughter. Verbalize understanding. Written instructions given.

## 2017-09-22 NOTE — IP AVS SNAPSHOT
Jennifer Kev 
 
 
 [address] [facility] 
[phone] Patient: Miguel Garcia MRN: [id number] USV:[id number] Discharge Summary 9/22/2017 Miguel Garcia MRN[de-identified]  [id number] Admission Information Provider Pager Service Admission Date Expected D/C Date Briana Josue MD  CARDIAC CATH LAB 9/22/2017 9/22/2017 Actual LOS Patient Class 0 days OUTPATIENT Follow-up Information Follow up With Details Comments Contact Info Jeff Solis MD On 10/3/2017 at 3:10 p.m. 11 [facility] [address] [facility] THORACIC [facility] [address] 
[phone] Current Discharge Medication List  
  
ASK your doctor about these medications Dose & Instructions Dispensing Information Comments Morning Noon Evening Bedtime CALCIUM 600 WITH VITAMIN D3 600 mg(1,500mg) -400 unit Cap Generic drug:  Calcium-Cholecalciferol (D3) Your last dose was: Your next dose is:    
   
   
 Dose:  1 Cap Take 1 Cap by mouth two (2) times a day. Refills:  0  
     
   
   
   
  
 colestipol 1 gram tablet Commonly known as:  COLESTID Your last dose was: Your next dose is:    
   
   
 Dose:  1 g Take 1 Tab by mouth daily. \"NAME ALERT\" Quantity:  90 Tab Refills:  3 FISH OIL PO Your last dose was: Your next dose is: Take  by mouth daily. Refills:  0 FORMULA MULTIVIT & MINERAL PO Your last dose was: Your next dose is: Take  by mouth daily. Refills:  0  
     
   
   
   
  
 garlic Cap Your last dose was: Your next dose is: Take  by mouth daily. Refills:  0  
     
   
   
   
  
 levothyroxine 75 mcg tablet Commonly known as:  SYNTHROID Your last dose was: Your next dose is:    
   
   
 Dose:  75 mcg Take 1 Tab by mouth daily. Generic OK, \"NAME ALERT\"  Indications: hypothyroidism Quantity:  90 Tab Refills:  3  
     
   
   
   
  
 lisinopril-hydroCHLOROthiazide 20-12.5 mg per tablet Commonly known as:  Maverick Jacobsen Your last dose was: Your next dose is:    
   
   
 Dose:  1 Tab Take 1 Tab by mouth daily. Quantity:  90 Tab Refills:  1  
 +++NAME ALERT+++ OTHER Your last dose was: Your next dose is: OTC memory tablet- qd Refills:  0  
     
   
   
   
  
 TYLENOL 325 mg tablet Generic drug:  acetaminophen Your last dose was: Your next dose is:    
   
   
 Dose:  650 mg  
take 650 mg by mouth every four (4) hours as needed for Pain. Refills:  0  
     
   
   
   
  
 VITAMIN B-12 500 mcg tablet Generic drug:  cyanocobalamin Your last dose was: Your next dose is:    
   
   
 Dose:  500 mcg  
take 500 mcg by mouth daily. Refills:  0 General Information Please provide this summary of care documentation to your next provider. Allergies Unspecified:  Lipitor [Atorvastatin]; Penicillin G  
  
Current Immunizations  Reviewed on 10/27/2016 Name Date Influenza High Dose Vaccine PF 10/27/2016, 10/16/2015 Pneumococcal Conjugate (PCV-13) 10/27/2016 Discharge Instructions Discharge Instructions AFTER YOU TRANSESOPHAGEAL ECHOCARDIOGRAM 
 
Be sure someone else drives you home. You may feel drowsy for several hours. Do not eat or drink for at least two hours after your procedure. Your throat will be numb and there is a risk you might have difficulty swallowing for a while. Be careful when you do eat or drink for the first time especially with hot fluids since you could easily burn your throat. Call your doctor if: 
 
· You are bleeding from your throat or mouth. · You have trouble breathing all of a sudden. · You have chest pain or any pain that spreads to your neck, jaw, or arms. · You have questions or concerns. · You have a fever greater than 101°F. 
 
Doctor: Carissa Rico [phone] Special Instructions: 
 
No driving for 24 hours. Discharge Orders None  
  
` Patient Signature:  ____________________________________________________________ Date:  ____________________________________________________________  
  
 Claudene Born Provider Signature:  ____________________________________________________________ Date:  ____________________________________________________________

## 2017-09-22 NOTE — PROGRESS NOTES
TRANSFER - OUT REPORT:    Verbal report given to [facility] RN(name) on [employer]  being transferred to CPRU(unit) for routine progression of care       Report consisted of patients Situation, Background, Assessment and   Recommendations(SBAR). Information from the following report(s) SBAR and Procedure Summary was reviewed with the receiving nurse. Lines:       Opportunity for questions and clarification was provided. Pt was fully changed and cleared after procedure for urination through her pad.

## 2017-09-22 NOTE — DISCHARGE INSTRUCTIONS
AFTER YOU TRANSESOPHAGEAL ECHOCARDIOGRAM    Be sure someone else drives you home. You may feel drowsy for several hours. Do not eat or drink for at least two hours after your procedure. Your throat will be numb and there is a risk you might have difficulty swallowing for a while. Be careful when you do eat or drink for the first time especially with hot fluids since you could easily burn your throat. Call your doctor if:    · You are bleeding from your throat or mouth. · You have trouble breathing all of a sudden. · You have chest pain or any pain that spreads to your neck, jaw, or arms. · You have questions or concerns. · You have a fever greater than 101°F.    Doctor: Anel Rhodes [phone]    Special Instructions:    No driving for 24 hours.

## 2017-09-22 NOTE — PROGRESS NOTES
Patient received to [address] [place] [address] room # 3  Ambulatory from [facility]. Patient scheduled for CARMEN today with Dr Miguel Cervantes. Procedure reviewed & questions answered, voiced good understanding consent obtained & placed on chart. All medications and medical history reviewed. Will prep patient per orders. Patient & family updated on plan of care.

## 2018-06-04 ENCOUNTER — HOSPITAL ENCOUNTER (EMERGENCY)
Age: 83
Discharge: HOME | End: 2018-06-04
Attending: EMERGENCY MEDICINE
Payer: MEDICARE

## 2018-06-04 ENCOUNTER — APPOINTMENT (OUTPATIENT)
Dept: CT IMAGING | Age: 83
End: 2018-06-04
Attending: EMERGENCY MEDICINE
Payer: MEDICARE

## 2018-06-04 ENCOUNTER — APPOINTMENT (OUTPATIENT)
Dept: GENERAL RADIOLOGY | Age: 83
End: 2018-06-04
Attending: EMERGENCY MEDICINE
Payer: MEDICARE

## 2018-06-04 VITALS
SYSTOLIC BLOOD PRESSURE: 131 MMHG | OXYGEN SATURATION: 96 % | WEIGHT: 157.8 LBS | RESPIRATION RATE: 17 BRPM | DIASTOLIC BLOOD PRESSURE: 63 MMHG | HEART RATE: 66 BPM | TEMPERATURE: 97.9 F | BODY MASS INDEX: 29.04 KG/M2 | HEIGHT: 62 IN

## 2018-06-04 DIAGNOSIS — N39.0 URINARY TRACT INFECTION WITHOUT HEMATURIA, SITE UNSPECIFIED: ICD-10-CM

## 2018-06-04 DIAGNOSIS — R55 SYNCOPE AND COLLAPSE: Primary | ICD-10-CM

## 2018-06-04 PROBLEM — H91.91 HEARING LOSS OF RIGHT EAR: Status: ACTIVE | Noted: 2018-06-04

## 2018-06-04 PROBLEM — R29.898 WEAKNESS OF BOTH LOWER EXTREMITIES: Status: ACTIVE | Noted: 2018-06-04

## 2018-06-04 LAB
ALBUMIN SERPL-MCNC: 3.2 G/DL (ref 3.2–4.6)
ALBUMIN/GLOB SERPL: 0.9 {RATIO} (ref 1.2–3.5)
ALP SERPL-CCNC: 40 U/L (ref 50–136)
ALT SERPL-CCNC: 19 U/L (ref 12–65)
ANION GAP SERPL CALC-SCNC: 9 MMOL/L (ref 7–16)
AST SERPL-CCNC: 49 U/L (ref 15–37)
ATRIAL RATE: 102 BPM
BACTERIA URNS QL MICRO: ABNORMAL /HPF
BASOPHILS # BLD: 0 K/UL (ref 0–0.2)
BASOPHILS NFR BLD: 1 % (ref 0–2)
BILIRUB SERPL-MCNC: 0.4 MG/DL (ref 0.2–1.1)
BNP SERPL-MCNC: 229 PG/ML
BUN SERPL-MCNC: 29 MG/DL (ref 8–23)
CALCIUM SERPL-MCNC: 8.5 MG/DL (ref 8.3–10.4)
CALCULATED R AXIS, ECG10: 53 DEGREES
CALCULATED T AXIS, ECG11: 53 DEGREES
CASTS URNS QL MICRO: 0 /LPF
CHLORIDE SERPL-SCNC: 110 MMOL/L (ref 98–107)
CO2 SERPL-SCNC: 21 MMOL/L (ref 21–32)
CREAT SERPL-MCNC: 1.27 MG/DL (ref 0.6–1)
CRYSTALS URNS QL MICRO: 0 /LPF
DIAGNOSIS, 93000: NORMAL
DIFFERENTIAL METHOD BLD: ABNORMAL
EOSINOPHIL # BLD: 0.1 K/UL (ref 0–0.8)
EOSINOPHIL NFR BLD: 1 % (ref 0.5–7.8)
EPI CELLS #/AREA URNS HPF: ABNORMAL /HPF
ERYTHROCYTE [DISTWIDTH] IN BLOOD BY AUTOMATED COUNT: 13.6 % (ref 11.9–14.6)
GLOBULIN SER CALC-MCNC: 3.7 G/DL (ref 2.3–3.5)
GLUCOSE SERPL-MCNC: 121 MG/DL (ref 65–100)
HCT VFR BLD AUTO: 33.4 % (ref 35.8–46.3)
HGB BLD-MCNC: 11.2 G/DL (ref 11.7–15.4)
IMM GRANULOCYTES # BLD: 0.1 K/UL (ref 0–0.5)
IMM GRANULOCYTES NFR BLD AUTO: 1 % (ref 0–5)
LYMPHOCYTES # BLD: 1.4 K/UL (ref 0.5–4.6)
LYMPHOCYTES NFR BLD: 18 % (ref 13–44)
MCH RBC QN AUTO: 31.3 PG (ref 26.1–32.9)
MCHC RBC AUTO-ENTMCNC: 33.5 G/DL (ref 31.4–35)
MCV RBC AUTO: 93.3 FL (ref 79.6–97.8)
MONOCYTES # BLD: 0.7 K/UL (ref 0.1–1.3)
MONOCYTES NFR BLD: 8 % (ref 4–12)
MUCOUS THREADS URNS QL MICRO: 0 /LPF
NEUTS SEG # BLD: 5.8 K/UL (ref 1.7–8.2)
NEUTS SEG NFR BLD: 71 % (ref 43–78)
OTHER OBSERVATIONS,UCOM: ABNORMAL
PLATELET # BLD AUTO: 177 K/UL (ref 150–450)
PMV BLD AUTO: 10 FL (ref 10.8–14.1)
POTASSIUM SERPL-SCNC: 3.7 MMOL/L (ref 3.5–5.1)
POTASSIUM SERPL-SCNC: 6.6 MMOL/L (ref 3.5–5.1)
PROT SERPL-MCNC: 6.9 G/DL (ref 6.3–8.2)
Q-T INTERVAL, ECG07: 398 MS
QRS DURATION, ECG06: 98 MS
QTC CALCULATION (BEZET), ECG08: 429 MS
RBC # BLD AUTO: 3.58 M/UL (ref 4.05–5.25)
RBC #/AREA URNS HPF: ABNORMAL /HPF
SODIUM SERPL-SCNC: 140 MMOL/L (ref 136–145)
TROPONIN I SERPL-MCNC: 0.02 NG/ML (ref 0.02–0.05)
TROPONIN I SERPL-MCNC: <0.02 NG/ML (ref 0.02–0.05)
VENTRICULAR RATE, ECG03: 70 BPM
WBC # BLD AUTO: 8 K/UL (ref 4.3–11.1)
WBC URNS QL MICRO: >100 /HPF

## 2018-06-04 PROCEDURE — 74011250636 HC RX REV CODE- 250/636: Performed by: EMERGENCY MEDICINE

## 2018-06-04 PROCEDURE — 84132 ASSAY OF SERUM POTASSIUM: CPT | Performed by: EMERGENCY MEDICINE

## 2018-06-04 PROCEDURE — 87186 SC STD MICRODIL/AGAR DIL: CPT | Performed by: EMERGENCY MEDICINE

## 2018-06-04 PROCEDURE — 93005 ELECTROCARDIOGRAM TRACING: CPT | Performed by: EMERGENCY MEDICINE

## 2018-06-04 PROCEDURE — 71046 X-RAY EXAM CHEST 2 VIEWS: CPT

## 2018-06-04 PROCEDURE — 84484 ASSAY OF TROPONIN QUANT: CPT | Performed by: EMERGENCY MEDICINE

## 2018-06-04 PROCEDURE — 80053 COMPREHEN METABOLIC PANEL: CPT | Performed by: EMERGENCY MEDICINE

## 2018-06-04 PROCEDURE — 99285 EMERGENCY DEPT VISIT HI MDM: CPT | Performed by: EMERGENCY MEDICINE

## 2018-06-04 PROCEDURE — 87088 URINE BACTERIA CULTURE: CPT | Performed by: EMERGENCY MEDICINE

## 2018-06-04 PROCEDURE — 83880 ASSAY OF NATRIURETIC PEPTIDE: CPT | Performed by: EMERGENCY MEDICINE

## 2018-06-04 PROCEDURE — 81015 MICROSCOPIC EXAM OF URINE: CPT | Performed by: EMERGENCY MEDICINE

## 2018-06-04 PROCEDURE — 70450 CT HEAD/BRAIN W/O DYE: CPT

## 2018-06-04 PROCEDURE — 85025 COMPLETE CBC W/AUTO DIFF WBC: CPT | Performed by: EMERGENCY MEDICINE

## 2018-06-04 PROCEDURE — 96365 THER/PROPH/DIAG IV INF INIT: CPT | Performed by: EMERGENCY MEDICINE

## 2018-06-04 PROCEDURE — 81003 URINALYSIS AUTO W/O SCOPE: CPT | Performed by: EMERGENCY MEDICINE

## 2018-06-04 PROCEDURE — 87086 URINE CULTURE/COLONY COUNT: CPT | Performed by: EMERGENCY MEDICINE

## 2018-06-04 PROCEDURE — 74011000258 HC RX REV CODE- 258: Performed by: EMERGENCY MEDICINE

## 2018-06-04 RX ORDER — CEPHALEXIN 500 MG/1
500 CAPSULE ORAL 4 TIMES DAILY
Qty: 28 CAP | Refills: 0 | Status: SHIPPED | OUTPATIENT
Start: 2018-06-04 | End: 2018-06-11

## 2018-06-04 RX ADMIN — CEFTRIAXONE SODIUM 2 G: 2 INJECTION, POWDER, FOR SOLUTION INTRAMUSCULAR; INTRAVENOUS at 21:43

## 2018-06-04 NOTE — Clinical Note
Please follow up with her primary care provider as well as her cardiologist as soon as possible. Return for any new or concerning issues.

## 2018-06-04 NOTE — ED PROVIDER NOTES
HPI Comments: Patient is an 61-year-old female presenting with syncope. Reports earlier today she was ambulating in [employer] where she became extremely lightheaded. Her daughter reports she checked her pulse and stated there was very high. Patient then lost consciousness for about 30 seconds. She regained consciousness and states she now feels significantly improved. She is denying any significant complaints. Past medical history includes severe mitral stenosis with left atrial enlargement, coronary artery disease and hypertension. States valve cannot be repaired due to other comorbidities. Denies history of cardiac dysrhythmias. currently denies chest pain, shortness of breath, headache. Patient is a 80 y.o. female presenting with syncope. The history is provided by the patient. No  was used. Syncope    Pertinent negatives include no confusion, no fever, no abdominal pain, no headaches and no back pain. Her past medical history is significant for syncope. Past Medical History:   Diagnosis Date    CAD (coronary artery disease)     Endocrine disease     hypothyroid    Essential hypertension     Gastrointestinal disorder     hiatal hernia    Osteoarthrosis, unspecified whether generalized or localized, shoulder region 7/21/2015    Thyroid disease        Past Surgical History:   Procedure Laterality Date    ABDOMEN SURGERY PROC UNLISTED      cystocele/ rectocele    CARDIAC SURG PROCEDURE UNLIST      CATH 9/15/17    HX GYN      hysterectomy    HX HEENT      yolanda. cataract    HX ORTHOPAEDIC      right ankle pin         Family History:   Problem Relation Age of Onset    Cancer Mother      breast    Cancer Father      lung       Social History     Social History    Marital status:      Spouse name: N/A    Number of children: N/A    Years of education: N/A     Occupational History    Not on file.      Social History Main Topics    Smoking status: Never Smoker  Smokeless tobacco: Never Used    Alcohol use No    Drug use: No    Sexual activity: No     Other Topics Concern    Not on file     Social History Narrative         ALLERGIES: Lipitor [atorvastatin] and Penicillin g    Review of Systems   Constitutional: Negative for fatigue and fever. HENT: Negative for sore throat. Respiratory: Negative for cough, chest tightness and shortness of breath. Cardiovascular: Positive for syncope. Negative for leg swelling. Gastrointestinal: Negative for abdominal pain. Genitourinary: Negative for dysuria. Musculoskeletal: Negative for back pain. Neurological: Positive for syncope. Negative for headaches. Psychiatric/Behavioral: Negative for confusion. Vitals:    06/04/18 1827   BP: 138/62   Pulse: 76   Resp: 16   Temp: 97.9 °F (36.6 °C)   SpO2: 96%   Weight: 71.6 kg (157 lb 12.8 oz)   Height: 5' 2\" (1.575 m)            Physical Exam   Constitutional: She is oriented to person, place, and time. She appears well-developed and well-nourished. No distress. HENT:   Head: Normocephalic and atraumatic. Eyes: Conjunctivae and EOM are normal. Pupils are equal, round, and reactive to light. Neck: Normal range of motion. Neck supple. Cardiovascular: Normal rate, regular rhythm and normal heart sounds. Pulmonary/Chest: Effort normal and breath sounds normal. No respiratory distress. She has no wheezes. She has no rales. Abdominal: Soft. She exhibits no distension. There is no tenderness. There is no rebound. Musculoskeletal: Normal range of motion. She exhibits no edema or tenderness. Neurological: She is alert and oriented to person, place, and time. Skin: Skin is warm and dry. No rash noted. She is not diaphoretic. Psychiatric: She has a normal mood and affect. Her behavior is normal.   Vitals reviewed.        MDM  Number of Diagnoses or Management Options  Syncope and collapse: new and does not require workup  Urinary tract infection without hematuria, site unspecified: new and does not require workup  Diagnosis management comments: Patient resting comfortably here in the ED. Patient found to have urinary tract infection was given ceftriaxone. Troponin ×2 negative. BNP is moderately elevated without any signs of pulmonary edema or hypoxia. She has been ambulating in the ED without significant difficulty. Neuro exam remains benign. EKG demonstrated possible atrial fibrillation? Difficult to  Interpret. Subsequent EKG showed what appears to be normal sinus rhythm. No episodes of significant or concerning tachycardia. Physical safely discharge her and have her follow with Dr. Emilia Davis near future. Return precautions discussed. Patient and family  In understanding.        Amount and/or Complexity of Data Reviewed  Clinical lab tests: reviewed and ordered (Results for orders placed or performed during the hospital encounter of 06/04/18  -CBC WITH AUTOMATED DIFF       Result                                            Value                         Ref Range                       WBC                                               8.0                           4.3 - 11.1 K/uL                 RBC                                               3.58 (L)                      4.05 - 5.25 M/uL                HGB                                               11.2 (L)                      11.7 - 15.4 g/dL                HCT                                               33.4 (L)                      35.8 - 46.3 %                   MCV                                               93.3                          79.6 - 97.8 FL                  MCH                                               31.3                          26.1 - 32.9 PG                  MCHC                                              33.5                          31.4 - 35.0 g/dL                RDW                                               13.6                          11.9 - 14.6 % PLATELET                                          177                           150 - 450 K/uL                  MPV                                               10.0 (L)                      10.8 - 14.1 FL                  DF                                                AUTOMATED                                                     NEUTROPHILS                                       71                            43 - 78 %                       LYMPHOCYTES                                       18                            13 - 44 %                       MONOCYTES                                         8                             4.0 - 12.0 %                    EOSINOPHILS                                       1                             0.5 - 7.8 %                     BASOPHILS                                         1                             0.0 - 2.0 %                     IMMATURE GRANULOCYTES                             1                             0.0 - 5.0 %                     ABS. NEUTROPHILS                                  5.8                           1.7 - 8.2 K/UL                  ABS. LYMPHOCYTES                                  1.4                           0.5 - 4.6 K/UL                  ABS. MONOCYTES                                    0.7                           0.1 - 1.3 K/UL                  ABS. EOSINOPHILS                                  0.1                           0.0 - 0.8 K/UL                  ABS. BASOPHILS                                    0.0                           0.0 - 0.2 K/UL                  ABS. IMM.  GRANS.                                  0.1                           0.0 - 0.5 K/UL             -METABOLIC PANEL, COMPREHENSIVE       Result                                            Value                         Ref Range                       Sodium                                            140                           136 - 145 mmol/L Potassium                                         6.6 (HH)                      3.5 - 5.1 mmol/L                Chloride                                          110 (H)                       98 - 107 mmol/L                 CO2                                               21                            21 - 32 mmol/L                  Anion gap                                         9                             7 - 16 mmol/L                   Glucose                                           121 (H)                       65 - 100 mg/dL                  BUN                                               29 (H)                        8 - 23 MG/DL                    Creatinine                                        1.27 (H)                      0.6 - 1.0 MG/DL                 GFR est AA                                        52 (L)                        >60 ml/min/1.73m2               GFR est non-AA                                    43 (L)                        >60 ml/min/1.73m2               Calcium                                           8.5                           8.3 - 10.4 MG/DL                Bilirubin, total                                  0.4                           0.2 - 1.1 MG/DL                 ALT (SGPT)                                        19                            12 - 65 U/L                     AST (SGOT)                                        49 (H)                        15 - 37 U/L                     Alk. phosphatase                                  40 (L)                        50 - 136 U/L                    Protein, total                                    6.9                           6.3 - 8.2 g/dL                  Albumin                                           3.2                           3.2 - 4.6 g/dL                  Globulin                                          3.7 (H)                       2.3 - 3.5 g/dL                  A-G Ratio 0.9 (L)                       1.2 - 3.5                  -TROPONIN I       Result                                            Value                         Ref Range                       Troponin-I, Qt.                                   <0.02 (L)                     0.02 - 0.05 NG/ML          -BNP       Result                                            Value                         Ref Range                       BNP                                               229                           pg/mL                      -POTASSIUM       Result                                            Value                         Ref Range                       Potassium                                         3.7                           3.5 - 5.1 mmol/L           -URINE MICROSCOPIC       Result                                            Value                         Ref Range                       WBC                                               >100                          0 /hpf                          RBC                                               0-3                           0 /hpf                          Epithelial cells                                  3-5                           0 /hpf                          Bacteria                                          4+ (H)                        0 /hpf                          Casts                                             0                             0 /lpf                          Crystals, urine                                   0                             0 /LPF                          Mucus                                             0                             0 /lpf                          Other observations                                RESULTS VERIFIED MANUALLY                                -TROPONIN I       Result                                            Value                         Ref Range                       Troponin-I, Qt. 0.02                          0.02 - 0.05 NG/ML          -EKG, 12 LEAD, INITIAL       Result                                            Value                         Ref Range                       Ventricular Rate                                  70                            BPM                             Atrial Rate                                       102                           BPM                             QRS Duration                                      98                            ms                              Q-T Interval                                      398                           ms                              QTC Calculation (Bezet)                           429                           ms                              Calculated R Axis                                 53                            degrees                         Calculated T Axis                                 53                            degrees                         Diagnosis                                                                                                   !! AGE AND GENDER SPECIFIC ECG ANALYSIS !! Atrial fibrillation with premature ventricular or aberrantly conducted    complexes   Abnormal ECG   When compared with ECG of 22-SEP-2017 09:02,   Atrial fibrillation has replaced Sinus rhythm   Confirmed by ST MELISSA DALTON MD (), HA AMOS ([id number]) on 6/4/2018 10:57:32 PM     )  Tests in the radiology section of CPT®: ordered and reviewed (Xr Chest Pa Lat    Result Date: 6/4/2018  PA AND LATERAL CHEST X-RAY. Clinical Indication: Syncope, chest pain Comparison: Chest x-ray dated 8/1/2014 Findings: 2 views of the chest submitted demonstrate elevation of the left hemidiaphragm from a dilated gastric bubble. This is unchanged from the prior exam. The lungs are otherwise clear. The cardiac silhouette and mediastinum are stable.      IMPRESSION: Persistent elevation the left hemidiaphragm, otherwise no acute abnormality. Ct Head Wo Cont    Result Date: 6/4/2018  CT of the head without contrast. CLINICAL INDICATION: Syncope PROCEDURE: Serial thin section axial images are obtained from the cranial vertex through the skull base without the administration of intravenous contrast. Radiation dose reduction techniques were used for this study. Our CT scanners use one or all of the following: Automated exposure control, adjusted of the mA and/or kV according to patient size, iterative reconstruction COMPARISON: No prior. FINDINGS: There is no acute intracranial hemorrhage, mass, or mass effect. No abnormal extra-axial fluid collections identified. There is no hydrocephalus. The basilar cisterns are widely patent. The gray-white matter brain parenchymal interface is well-maintained. No skull fracture or aggressive osseous lesion noted. The mastoid air cells and included paranasal sinuses are clear. IMPRESSION: No acute intracranial abnormality.     )  Review and summarize past medical records: yes  Independent visualization of images, tracings, or specimens: yes    Risk of Complications, Morbidity, and/or Mortality  Presenting problems: high  Diagnostic procedures: moderate  Management options: moderate    Patient Progress  Patient progress: stable        ED Course   Comment By Time   Discussed case with  of cardiology. He reports she is most likely in atrial fibrillation with a dilated left atrium. She is currently rate controlled. We'll watch on the monitor and repeat cardiac enzyme.   He feels she does not need to be inpatient at this time Lanelle Lombard, MD 06/04 2040       Procedures

## 2018-06-04 NOTE — ED TRIAGE NOTES
Per ems report patient had a syncopal episode while shopping at [employer] today. Patient told ems 1 week history of nausea and vomiting and was seen by her family doctor today for the same. Ems gave 300ml of 0.9% normal saline enroute and placed a 20 G iv int patients right hand. Patient voices no complaints at this time. Patient states she was with her daughter shopping today when the episode occurred and she started feeling dizzy so she sat down. Patient states after she sat down her ears started ringing and she passed out for approximately 20-30 seconds according to her daughter. Patient states she felt very flushed and diaphoretic after the incident happened. Patient denies any fall or injury.

## 2018-06-05 LAB
ATRIAL RATE: 60 BPM
CALCULATED P AXIS, ECG09: 105 DEGREES
CALCULATED R AXIS, ECG10: 63 DEGREES
CALCULATED T AXIS, ECG11: 54 DEGREES
DIAGNOSIS, 93000: NORMAL
P-R INTERVAL, ECG05: 188 MS
Q-T INTERVAL, ECG07: 418 MS
QRS DURATION, ECG06: 100 MS
QTC CALCULATION (BEZET), ECG08: 418 MS
VENTRICULAR RATE, ECG03: 60 BPM

## 2018-06-05 NOTE — ED NOTES

## 2018-06-05 NOTE — DISCHARGE INSTRUCTIONS
Lightheadedness or Faintness: Care Instructions  Your Care Instructions  Lightheadedness is a feeling that you are about to faint or \"pass out. \" You do not feel as if you or your surroundings are moving. It is different from vertigo, which is the feeling that you or things around you are spinning or tilting. Lightheadedness usually goes away or gets better when you lie down. If lightheadedness gets worse, it can lead to a fainting spell. It is common to feel lightheaded from time to time. Lightheadedness usually is not caused by a serious problem. It often is caused by a short-lasting drop in blood pressure and blood flow to your head that occurs when you get up too quickly from a seated or lying position. Follow-up care is a key part of your treatment and safety. Be sure to make and go to all appointments, and call your doctor if you are having problems. It's also a good idea to know your test results and keep a list of the medicines you take. How can you care for yourself at home? · Lie down for 1 or 2 minutes when you feel lightheaded. After lying down, sit up slowly and remain sitting for 1 to 2 minutes before slowly standing up. · Avoid movements, positions, or activities that have made you lightheaded in the past.  · Get plenty of rest, especially if you have a cold or flu, which can cause lightheadedness. · Make sure you drink plenty of fluids, especially if you have a fever or have been sweating. · Do not drive or put yourself and others in danger while you feel lightheaded. When should you call for help? Call 911 anytime you think you may need emergency care. For example, call if:  ? · You have symptoms of a stroke. These may include:  ¨ Sudden numbness, tingling, weakness, or loss of movement in your face, arm, or leg, especially on only one side of your body. ¨ Sudden vision changes. ¨ Sudden trouble speaking. ¨ Sudden confusion or trouble understanding simple statements.   ¨ Sudden problems with walking or balance. ¨ A sudden, severe headache that is different from past headaches. ? · You have symptoms of a heart attack. These may include:  ¨ Chest pain or pressure, or a strange feeling in the chest.  ¨ Sweating. ¨ Shortness of breath. ¨ Nausea or vomiting. ¨ Pain, pressure, or a strange feeling in the back, neck, jaw, or upper belly or in one or both shoulders or arms. ¨ Lightheadedness or sudden weakness. ¨ A fast or irregular heartbeat. After you call 911, the  may tell you to chew 1 adult-strength or 2 to 4 low-dose aspirin. Wait for an ambulance. Do not try to drive yourself. ? Watch closely for changes in your health, and be sure to contact your doctor if:  ? · Your lightheadedness gets worse or does not get better with home care. Where can you learn more? Go to http://myla-fady.info/. Enter X888 in the search box to learn more about \"Lightheadedness or Faintness: Care Instructions. \"  Current as of: March 20, 2017  Content Version: 11.4  © 5678-7236 [employer]. Care instructions adapted under license by [employer] (which disclaims liability or warranty for this information). If you have questions about a medical condition or this instruction, always ask your healthcare professional. [place] [address] any warranty or liability for your use of this information. Urinary Tract Infection in Women: Care Instructions  Your Care Instructions    A urinary tract infection, or UTI, is a general term for an infection anywhere between the kidneys and the urethra (where urine comes out). Most UTIs are bladder infections. They often cause pain or burning when you urinate. UTIs are caused by bacteria and can be cured with antibiotics. Be sure to complete your treatment so that the infection goes away. Follow-up care is a key part of your treatment and safety.  Be sure to make and go to all appointments, and call your doctor if you are having problems. It's also a good idea to know your test results and keep a list of the medicines you take. How can you care for yourself at home? · Take your antibiotics as directed. Do not stop taking them just because you feel better. You need to take the full course of antibiotics. · Drink extra water and other fluids for the next day or two. This may help wash out the bacteria that are causing the infection. (If you have kidney, heart, or liver disease and have to limit fluids, talk with your doctor before you increase your fluid intake.)  · Avoid drinks that are carbonated or have caffeine. They can irritate the bladder. · Urinate often. Try to empty your bladder each time. · To relieve pain, take a hot bath or lay a heating pad set on low over your lower belly or genital area. Never go to sleep with a heating pad in place. To prevent UTIs  · Drink plenty of water each day. This helps you urinate often, which clears bacteria from your system. (If you have kidney, heart, or liver disease and have to limit fluids, talk with your doctor before you increase your fluid intake.)  · Urinate when you need to. · Urinate right after you have sex. · Change sanitary pads often. · Avoid douches, bubble baths, feminine hygiene sprays, and other feminine hygiene products that have deodorants. · After going to the bathroom, wipe from front to back. When should you call for help? Call your doctor now or seek immediate medical care if:  ? · Symptoms such as fever, chills, nausea, or vomiting get worse or appear for the first time. ? · You have new pain in your back just below your rib cage. This is called flank pain. ? · There is new blood or pus in your urine. ? · You have any problems with your antibiotic medicine. ? Watch closely for changes in your health, and be sure to contact your doctor if:  ? · You are not getting better after taking an antibiotic for 2 days.    ? · Your symptoms go away but then come back. Where can you learn more? Go to http://myla-fady.info/. Enter R296 in the search box to learn more about \"Urinary Tract Infection in Women: Care Instructions. \"  Current as of: May 12, 2017  Content Version: 11.4  © 4497-7048 [employer]. Care instructions adapted under license by [employer] (which disclaims liability or warranty for this information). If you have questions about a medical condition or this instruction, always ask your healthcare professional. [address] any warranty or liability for your use of this information.

## 2018-06-09 LAB
BACTERIA SPEC CULT: ABNORMAL
BACTERIA SPEC CULT: ABNORMAL
SERVICE CMNT-IMP: ABNORMAL

## 2018-12-04 PROBLEM — I12.9 HYPERTENSION, RENAL DISEASE, STAGE 1-4 OR UNSPECIFIED CHRONIC KIDNEY DISEASE: Status: ACTIVE | Noted: 2017-01-17

## 2019-01-02 ENCOUNTER — HOSPITAL ENCOUNTER (OUTPATIENT)
Dept: MAMMOGRAPHY | Age: 84
Discharge: HOME | End: 2019-01-02
Attending: FAMILY MEDICINE
Payer: MEDICARE

## 2019-01-02 DIAGNOSIS — M85.80 OSTEOPENIA WITH HIGH RISK OF FRACTURE: ICD-10-CM

## 2019-01-02 DIAGNOSIS — M89.9 DISORDER OF BONE: ICD-10-CM

## 2019-01-02 PROCEDURE — 77080 DXA BONE DENSITY AXIAL: CPT

## 2019-01-29 PROBLEM — F03.90 DEMENTIA WITHOUT BEHAVIORAL DISTURBANCE (HCC): Status: ACTIVE | Noted: 2019-01-29

## 2019-02-12 ENCOUNTER — HOSPITAL ENCOUNTER (OUTPATIENT)
Dept: MRI IMAGING | Age: 84
Discharge: HOME | End: 2019-02-12
Attending: NURSE PRACTITIONER
Payer: MEDICARE

## 2019-02-12 DIAGNOSIS — F03.90 DEMENTIA WITHOUT BEHAVIORAL DISTURBANCE, UNSPECIFIED DEMENTIA TYPE: ICD-10-CM

## 2019-02-12 PROCEDURE — 70551 MRI BRAIN STEM W/O DYE: CPT

## 2019-05-28 PROBLEM — N18.30 BENIGN HYPERTENSION WITH CKD (CHRONIC KIDNEY DISEASE) STAGE III (HCC): Status: ACTIVE | Noted: 2017-01-17

## 2020-05-14 PROBLEM — R20.0 NUMBNESS AND TINGLING OF BOTH LEGS: Status: ACTIVE | Noted: 2020-05-14

## 2020-05-14 PROBLEM — Z91.81 HISTORY OF FALL: Status: ACTIVE | Noted: 2020-05-14

## 2020-05-14 PROBLEM — R20.2 NUMBNESS AND TINGLING OF BOTH LEGS: Status: ACTIVE | Noted: 2020-05-14

## 2020-11-19 PROBLEM — I48.20 CHRONIC ATRIAL FIBRILLATION (HCC): Status: ACTIVE | Noted: 2020-11-19

## 2020-11-19 PROBLEM — R53.1 WEAKNESS GENERALIZED: Status: ACTIVE | Noted: 2020-11-19

## 2021-06-22 ENCOUNTER — HOSPITAL ENCOUNTER (OUTPATIENT)
Dept: GENERAL RADIOLOGY | Age: 86
Discharge: HOME | End: 2021-06-22
Payer: MEDICARE

## 2021-06-22 ENCOUNTER — HOME HEALTH ADMISSION (OUTPATIENT)
Dept: HOME HEALTH SERVICES | Facility: HOME HEALTH | Age: 86
End: 2021-06-22
Payer: MEDICARE

## 2021-06-22 DIAGNOSIS — G89.29 CHRONIC BILATERAL LOW BACK PAIN WITH BILATERAL SCIATICA: ICD-10-CM

## 2021-06-22 DIAGNOSIS — M54.41 CHRONIC BILATERAL LOW BACK PAIN WITH BILATERAL SCIATICA: ICD-10-CM

## 2021-06-22 DIAGNOSIS — M54.42 CHRONIC BILATERAL LOW BACK PAIN WITH BILATERAL SCIATICA: ICD-10-CM

## 2021-06-22 PROCEDURE — 72100 X-RAY EXAM L-S SPINE 2/3 VWS: CPT

## 2021-06-23 ENCOUNTER — HOME CARE VISIT (OUTPATIENT)
Dept: SCHEDULING | Facility: HOME HEALTH | Age: 86
End: 2021-06-23
Payer: MEDICARE

## 2021-06-23 VITALS — SYSTOLIC BLOOD PRESSURE: 126 MMHG | HEART RATE: 60 BPM | TEMPERATURE: 97.1 F | DIASTOLIC BLOOD PRESSURE: 70 MMHG

## 2021-06-23 PROCEDURE — G0151 HHCP-SERV OF PT,EA 15 MIN: HCPCS

## 2021-06-23 PROCEDURE — 3331090002 HH PPS REVENUE DEBIT

## 2021-06-23 PROCEDURE — 3331090001 HH PPS REVENUE CREDIT

## 2021-06-23 PROCEDURE — 400018 HH-NO PAY CLAIM PROCEDURE

## 2021-06-23 PROCEDURE — 400013 HH SOC

## 2021-06-24 PROCEDURE — 3331090002 HH PPS REVENUE DEBIT

## 2021-06-24 PROCEDURE — 3331090001 HH PPS REVENUE CREDIT

## 2021-06-25 PROCEDURE — 3331090002 HH PPS REVENUE DEBIT

## 2021-06-25 PROCEDURE — 3331090001 HH PPS REVENUE CREDIT

## 2021-06-26 PROCEDURE — 3331090001 HH PPS REVENUE CREDIT

## 2021-06-26 PROCEDURE — 3331090002 HH PPS REVENUE DEBIT

## 2021-06-27 PROCEDURE — 3331090002 HH PPS REVENUE DEBIT

## 2021-06-27 PROCEDURE — 3331090001 HH PPS REVENUE CREDIT

## 2021-06-28 PROCEDURE — 3331090002 HH PPS REVENUE DEBIT

## 2021-06-28 PROCEDURE — 3331090001 HH PPS REVENUE CREDIT

## 2021-06-29 ENCOUNTER — HOME CARE VISIT (OUTPATIENT)
Dept: SCHEDULING | Facility: HOME HEALTH | Age: 86
End: 2021-06-29
Payer: MEDICARE

## 2021-06-29 VITALS
SYSTOLIC BLOOD PRESSURE: 132 MMHG | RESPIRATION RATE: 17 BRPM | HEART RATE: 66 BPM | DIASTOLIC BLOOD PRESSURE: 82 MMHG | OXYGEN SATURATION: 98 % | TEMPERATURE: 96.9 F

## 2021-06-29 PROCEDURE — 3331090001 HH PPS REVENUE CREDIT

## 2021-06-29 PROCEDURE — G0151 HHCP-SERV OF PT,EA 15 MIN: HCPCS

## 2021-06-29 PROCEDURE — 3331090002 HH PPS REVENUE DEBIT

## 2021-06-30 PROCEDURE — 3331090001 HH PPS REVENUE CREDIT

## 2021-06-30 PROCEDURE — 3331090002 HH PPS REVENUE DEBIT

## 2021-07-01 ENCOUNTER — HOME CARE VISIT (OUTPATIENT)
Dept: SCHEDULING | Facility: HOME HEALTH | Age: 86
End: 2021-07-01
Payer: MEDICARE

## 2021-07-01 VITALS
OXYGEN SATURATION: 96 % | TEMPERATURE: 98.1 F | SYSTOLIC BLOOD PRESSURE: 128 MMHG | DIASTOLIC BLOOD PRESSURE: 84 MMHG | HEART RATE: 64 BPM | RESPIRATION RATE: 17 BRPM

## 2021-07-01 PROCEDURE — 3331090002 HH PPS REVENUE DEBIT

## 2021-07-01 PROCEDURE — G0157 HHC PT ASSISTANT EA 15: HCPCS

## 2021-07-01 PROCEDURE — 3331090001 HH PPS REVENUE CREDIT

## 2021-07-02 PROCEDURE — 3331090001 HH PPS REVENUE CREDIT

## 2021-07-02 PROCEDURE — 3331090002 HH PPS REVENUE DEBIT

## 2021-07-03 PROCEDURE — 3331090002 HH PPS REVENUE DEBIT

## 2021-07-03 PROCEDURE — 3331090001 HH PPS REVENUE CREDIT

## 2021-07-04 PROCEDURE — 3331090001 HH PPS REVENUE CREDIT

## 2021-07-04 PROCEDURE — 3331090002 HH PPS REVENUE DEBIT

## 2021-07-05 PROCEDURE — 3331090001 HH PPS REVENUE CREDIT

## 2021-07-05 PROCEDURE — 3331090002 HH PPS REVENUE DEBIT

## 2021-07-06 ENCOUNTER — HOME CARE VISIT (OUTPATIENT)
Dept: SCHEDULING | Facility: HOME HEALTH | Age: 86
End: 2021-07-06
Payer: MEDICARE

## 2021-07-06 VITALS
TEMPERATURE: 98.1 F | DIASTOLIC BLOOD PRESSURE: 72 MMHG | HEART RATE: 80 BPM | OXYGEN SATURATION: 98 % | SYSTOLIC BLOOD PRESSURE: 112 MMHG | RESPIRATION RATE: 16 BRPM

## 2021-07-06 PROCEDURE — G0157 HHC PT ASSISTANT EA 15: HCPCS

## 2021-07-06 PROCEDURE — 3331090001 HH PPS REVENUE CREDIT

## 2021-07-06 PROCEDURE — 3331090002 HH PPS REVENUE DEBIT

## 2021-07-07 PROCEDURE — 3331090002 HH PPS REVENUE DEBIT

## 2021-07-07 PROCEDURE — 3331090001 HH PPS REVENUE CREDIT

## 2021-07-08 ENCOUNTER — HOME CARE VISIT (OUTPATIENT)
Dept: SCHEDULING | Facility: HOME HEALTH | Age: 86
End: 2021-07-08
Payer: MEDICARE

## 2021-07-08 VITALS
TEMPERATURE: 98.2 F | SYSTOLIC BLOOD PRESSURE: 124 MMHG | OXYGEN SATURATION: 94 % | RESPIRATION RATE: 18 BRPM | DIASTOLIC BLOOD PRESSURE: 70 MMHG | HEART RATE: 60 BPM

## 2021-07-08 PROBLEM — R26.81 UNSTABLE GAIT: Status: ACTIVE | Noted: 2021-07-08

## 2021-07-08 PROCEDURE — 3331090001 HH PPS REVENUE CREDIT

## 2021-07-08 PROCEDURE — G0157 HHC PT ASSISTANT EA 15: HCPCS

## 2021-07-08 PROCEDURE — 3331090002 HH PPS REVENUE DEBIT

## 2021-07-09 PROCEDURE — 3331090002 HH PPS REVENUE DEBIT

## 2021-07-09 PROCEDURE — 3331090001 HH PPS REVENUE CREDIT

## 2021-07-10 PROCEDURE — 3331090002 HH PPS REVENUE DEBIT

## 2021-07-10 PROCEDURE — 3331090001 HH PPS REVENUE CREDIT

## 2021-07-11 PROCEDURE — 3331090002 HH PPS REVENUE DEBIT

## 2021-07-11 PROCEDURE — 3331090001 HH PPS REVENUE CREDIT

## 2021-07-12 PROCEDURE — 3331090001 HH PPS REVENUE CREDIT

## 2021-07-12 PROCEDURE — 3331090002 HH PPS REVENUE DEBIT

## 2021-07-13 ENCOUNTER — HOME CARE VISIT (OUTPATIENT)
Dept: SCHEDULING | Facility: HOME HEALTH | Age: 86
End: 2021-07-13
Payer: MEDICARE

## 2021-07-13 VITALS
RESPIRATION RATE: 16 BRPM | TEMPERATURE: 97.9 F | HEART RATE: 80 BPM | OXYGEN SATURATION: 96 % | DIASTOLIC BLOOD PRESSURE: 64 MMHG | SYSTOLIC BLOOD PRESSURE: 130 MMHG

## 2021-07-13 PROCEDURE — G0157 HHC PT ASSISTANT EA 15: HCPCS

## 2021-07-13 PROCEDURE — 3331090002 HH PPS REVENUE DEBIT

## 2021-07-13 PROCEDURE — 3331090001 HH PPS REVENUE CREDIT

## 2021-07-14 PROCEDURE — 3331090002 HH PPS REVENUE DEBIT

## 2021-07-14 PROCEDURE — 3331090001 HH PPS REVENUE CREDIT

## 2021-07-15 ENCOUNTER — HOME CARE VISIT (OUTPATIENT)
Dept: SCHEDULING | Facility: HOME HEALTH | Age: 86
End: 2021-07-15
Payer: MEDICARE

## 2021-07-15 VITALS
DIASTOLIC BLOOD PRESSURE: 74 MMHG | SYSTOLIC BLOOD PRESSURE: 134 MMHG | TEMPERATURE: 97.7 F | RESPIRATION RATE: 16 BRPM | HEART RATE: 83 BPM | OXYGEN SATURATION: 96 %

## 2021-07-15 PROCEDURE — 3331090001 HH PPS REVENUE CREDIT

## 2021-07-15 PROCEDURE — 3331090002 HH PPS REVENUE DEBIT

## 2021-07-15 PROCEDURE — G0151 HHCP-SERV OF PT,EA 15 MIN: HCPCS

## 2021-10-29 PROBLEM — N18.4 BENIGN HYPERTENSION WITH CKD (CHRONIC KIDNEY DISEASE) STAGE IV (HCC): Status: ACTIVE | Noted: 2017-01-17

## 2022-01-01 ENCOUNTER — NURSE ONLY (OUTPATIENT)
Dept: FAMILY MEDICINE CLINIC | Facility: CLINIC | Age: 87
End: 2022-01-01

## 2022-01-01 DIAGNOSIS — R73.03 PREDIABETES: ICD-10-CM

## 2022-01-01 DIAGNOSIS — E03.9 HYPOTHYROIDISM, ADULT: ICD-10-CM

## 2022-01-01 DIAGNOSIS — I12.9 BENIGN HYPERTENSION WITH CKD (CHRONIC KIDNEY DISEASE) STAGE IV (HCC): ICD-10-CM

## 2022-01-01 DIAGNOSIS — E78.5 DYSLIPIDEMIA: ICD-10-CM

## 2022-01-01 DIAGNOSIS — N18.4 BENIGN HYPERTENSION WITH CKD (CHRONIC KIDNEY DISEASE) STAGE IV (HCC): ICD-10-CM

## 2022-01-01 LAB
ALBUMIN SERPL-MCNC: 3.7 G/DL (ref 3.2–4.6)
ALBUMIN/GLOB SERPL: 1.3 (ref 0.4–1.6)
ALP SERPL-CCNC: 71 U/L (ref 50–136)
ALT SERPL-CCNC: 12 U/L (ref 12–65)
ANION GAP SERPL CALC-SCNC: 5 MMOL/L (ref 2–11)
AST SERPL-CCNC: 10 U/L (ref 15–37)
BILIRUB SERPL-MCNC: 0.4 MG/DL (ref 0.2–1.1)
BUN SERPL-MCNC: 23 MG/DL (ref 8–23)
CALCIUM SERPL-MCNC: 9.1 MG/DL (ref 8.3–10.4)
CHLORIDE SERPL-SCNC: 106 MMOL/L (ref 101–110)
CHOLEST SERPL-MCNC: 174 MG/DL
CO2 SERPL-SCNC: 27 MMOL/L (ref 21–32)
CREAT SERPL-MCNC: 1.3 MG/DL (ref 0.6–1)
EST. AVERAGE GLUCOSE BLD GHB EST-MCNC: 120 MG/DL
GLOBULIN SER CALC-MCNC: 2.8 G/DL (ref 2.8–4.5)
GLUCOSE SERPL-MCNC: 127 MG/DL (ref 65–100)
HBA1C MFR BLD: 5.8 % (ref 4.8–5.6)
HDLC SERPL-MCNC: 50 MG/DL (ref 40–60)
HDLC SERPL: 3.5
LDLC SERPL CALC-MCNC: 102.6 MG/DL
POTASSIUM SERPL-SCNC: 4.7 MMOL/L (ref 3.5–5.1)
PROT SERPL-MCNC: 6.5 G/DL (ref 6.3–8.2)
SODIUM SERPL-SCNC: 138 MMOL/L (ref 133–143)
TRIGL SERPL-MCNC: 107 MG/DL (ref 35–150)
TSH, 3RD GENERATION: 4.53 UIU/ML (ref 0.36–3.74)
VLDLC SERPL CALC-MCNC: 21.4 MG/DL (ref 6–23)

## 2022-03-18 PROBLEM — F03.90 DEMENTIA WITHOUT BEHAVIORAL DISTURBANCE (HCC): Status: ACTIVE | Noted: 2019-01-29

## 2022-03-18 PROBLEM — I34.81 MITRAL VALVE ANNULAR CALCIFICATION: Status: ACTIVE | Noted: 2017-07-27

## 2022-03-19 PROBLEM — R20.0 NUMBNESS AND TINGLING OF BOTH LEGS: Status: ACTIVE | Noted: 2020-05-14

## 2022-03-19 PROBLEM — N18.4 BENIGN HYPERTENSION WITH CKD (CHRONIC KIDNEY DISEASE) STAGE IV (HCC): Status: ACTIVE | Noted: 2017-01-17

## 2022-03-19 PROBLEM — R20.2 NUMBNESS AND TINGLING OF BOTH LEGS: Status: ACTIVE | Noted: 2020-05-14

## 2022-03-19 PROBLEM — I48.20 CHRONIC ATRIAL FIBRILLATION (HCC): Status: ACTIVE | Noted: 2020-11-19

## 2022-03-19 PROBLEM — I12.9 BENIGN HYPERTENSION WITH CKD (CHRONIC KIDNEY DISEASE) STAGE IV (HCC): Status: ACTIVE | Noted: 2017-01-17

## 2022-03-19 PROBLEM — R29.898 WEAKNESS OF BOTH LOWER EXTREMITIES: Status: ACTIVE | Noted: 2018-06-04

## 2022-03-19 PROBLEM — R26.81 UNSTABLE GAIT: Status: ACTIVE | Noted: 2021-07-08

## 2022-03-20 PROBLEM — H91.91 HEARING LOSS OF RIGHT EAR: Status: ACTIVE | Noted: 2018-06-04

## 2022-03-20 PROBLEM — I51.7 LAE (LEFT ATRIAL ENLARGEMENT): Status: ACTIVE | Noted: 2017-07-27

## 2022-03-20 PROBLEM — R01.1 SYSTOLIC MURMUR: Status: ACTIVE | Noted: 2017-06-15

## 2022-05-05 ENCOUNTER — APPOINTMENT (OUTPATIENT)
Dept: GENERAL RADIOLOGY | Age: 87
DRG: 871 | End: 2022-05-05
Attending: EMERGENCY MEDICINE
Payer: MEDICARE

## 2022-05-05 ENCOUNTER — APPOINTMENT (OUTPATIENT)
Dept: CT IMAGING | Age: 87
DRG: 871 | End: 2022-05-05
Attending: EMERGENCY MEDICINE
Payer: MEDICARE

## 2022-05-05 ENCOUNTER — HOSPITAL ENCOUNTER (INPATIENT)
Age: 87
LOS: 1 days | Discharge: HOSPICE | DRG: 871 | End: 2022-05-06
Attending: EMERGENCY MEDICINE | Admitting: INTERNAL MEDICINE
Payer: MEDICARE

## 2022-05-05 DIAGNOSIS — A41.9 SEPSIS, DUE TO UNSPECIFIED ORGANISM, UNSPECIFIED WHETHER ACUTE ORGAN DYSFUNCTION PRESENT (HCC): ICD-10-CM

## 2022-05-05 DIAGNOSIS — U07.1 COVID-19: ICD-10-CM

## 2022-05-05 DIAGNOSIS — N30.00 ACUTE CYSTITIS WITHOUT HEMATURIA: Primary | ICD-10-CM

## 2022-05-05 DIAGNOSIS — R77.8 ELEVATED TROPONIN: ICD-10-CM

## 2022-05-05 PROBLEM — G93.41 ACUTE METABOLIC ENCEPHALOPATHY: Status: ACTIVE | Noted: 2022-05-05

## 2022-05-05 PROBLEM — R07.9 CHEST PAIN: Status: ACTIVE | Noted: 2022-05-05

## 2022-05-05 PROBLEM — N17.9 AKI (ACUTE KIDNEY INJURY) (HCC): Status: ACTIVE | Noted: 2022-01-01

## 2022-05-05 PROBLEM — N17.9 AKI (ACUTE KIDNEY INJURY) (HCC): Status: ACTIVE | Noted: 2022-05-05

## 2022-05-05 LAB
ALBUMIN SERPL-MCNC: 3.3 G/DL (ref 3.2–4.6)
ALBUMIN/GLOB SERPL: 0.8 {RATIO} (ref 1.2–3.5)
ALP SERPL-CCNC: 79 U/L (ref 50–136)
ALT SERPL-CCNC: 16 U/L (ref 12–65)
ANION GAP SERPL CALC-SCNC: 6 MMOL/L (ref 7–16)
APPEARANCE UR: ABNORMAL
AST SERPL-CCNC: 22 U/L (ref 15–37)
BACTERIA URNS QL MICRO: ABNORMAL /HPF
BASOPHILS # BLD: 0 K/UL (ref 0–0.2)
BASOPHILS # BLD: 0 K/UL (ref 0–0.2)
BASOPHILS NFR BLD: 0 % (ref 0–2)
BASOPHILS NFR BLD: 0 % (ref 0–2)
BILIRUB SERPL-MCNC: 0.5 MG/DL (ref 0.2–1.1)
BILIRUB UR QL: NEGATIVE
BNP SERPL-MCNC: ABNORMAL PG/ML
BUN SERPL-MCNC: 40 MG/DL (ref 8–23)
CALCIUM SERPL-MCNC: 8.5 MG/DL (ref 8.3–10.4)
CASTS URNS QL MICRO: 0 /LPF
CHLORIDE SERPL-SCNC: 103 MMOL/L (ref 98–107)
CO2 SERPL-SCNC: 25 MMOL/L (ref 21–32)
COLOR UR: YELLOW
COVID-19 RAPID TEST, COVR: DETECTED
CREAT SERPL-MCNC: 1.7 MG/DL (ref 0.6–1)
CRP SERPL-MCNC: 13.7 MG/DL (ref 0–0.9)
CRYSTALS URNS QL MICRO: 0 /LPF
DIFFERENTIAL METHOD BLD: ABNORMAL
DIFFERENTIAL METHOD BLD: ABNORMAL
EOSINOPHIL # BLD: 0 K/UL (ref 0–0.8)
EOSINOPHIL # BLD: 0 K/UL (ref 0–0.8)
EOSINOPHIL NFR BLD: 0 % (ref 0.5–7.8)
EOSINOPHIL NFR BLD: 0 % (ref 0.5–7.8)
EPI CELLS #/AREA URNS HPF: ABNORMAL /HPF
ERYTHROCYTE [DISTWIDTH] IN BLOOD BY AUTOMATED COUNT: 13.3 % (ref 11.9–14.6)
ERYTHROCYTE [DISTWIDTH] IN BLOOD BY AUTOMATED COUNT: 13.4 % (ref 11.9–14.6)
GLOBULIN SER CALC-MCNC: 4.1 G/DL (ref 2.3–3.5)
GLUCOSE SERPL-MCNC: 175 MG/DL (ref 65–100)
GLUCOSE UR STRIP.AUTO-MCNC: NEGATIVE MG/DL
HCT VFR BLD AUTO: 25.8 % (ref 35.8–46.3)
HCT VFR BLD AUTO: 30.9 % (ref 35.8–46.3)
HGB BLD-MCNC: 10 G/DL (ref 11.7–15.4)
HGB BLD-MCNC: 8.2 G/DL (ref 11.7–15.4)
HGB UR QL STRIP: ABNORMAL
IMM GRANULOCYTES # BLD AUTO: 0.1 K/UL (ref 0–0.5)
IMM GRANULOCYTES # BLD AUTO: 0.1 K/UL (ref 0–0.5)
IMM GRANULOCYTES NFR BLD AUTO: 1 % (ref 0–5)
IMM GRANULOCYTES NFR BLD AUTO: 1 % (ref 0–5)
KETONES UR QL STRIP.AUTO: NEGATIVE MG/DL
LACTATE SERPL-SCNC: 1.4 MMOL/L (ref 0.4–2)
LEUKOCYTE ESTERASE UR QL STRIP.AUTO: ABNORMAL
LYMPHOCYTES # BLD: 0.2 K/UL (ref 0.5–4.6)
LYMPHOCYTES # BLD: 0.3 K/UL (ref 0.5–4.6)
LYMPHOCYTES NFR BLD: 2 % (ref 13–44)
LYMPHOCYTES NFR BLD: 4 % (ref 13–44)
MAGNESIUM SERPL-MCNC: 2.2 MG/DL (ref 1.8–2.4)
MCH RBC QN AUTO: 30.6 PG (ref 26.1–32.9)
MCH RBC QN AUTO: 30.9 PG (ref 26.1–32.9)
MCHC RBC AUTO-ENTMCNC: 31.8 G/DL (ref 31.4–35)
MCHC RBC AUTO-ENTMCNC: 32.4 G/DL (ref 31.4–35)
MCV RBC AUTO: 95.4 FL (ref 79.6–97.8)
MCV RBC AUTO: 96.3 FL (ref 79.6–97.8)
MONOCYTES # BLD: 0.9 K/UL (ref 0.1–1.3)
MONOCYTES # BLD: 0.9 K/UL (ref 0.1–1.3)
MONOCYTES NFR BLD: 11 % (ref 4–12)
MONOCYTES NFR BLD: 8 % (ref 4–12)
MUCOUS THREADS URNS QL MICRO: 0 /LPF
NEUTS SEG # BLD: 6.4 K/UL (ref 1.7–8.2)
NEUTS SEG # BLD: 9.5 K/UL (ref 1.7–8.2)
NEUTS SEG NFR BLD: 84 % (ref 43–78)
NEUTS SEG NFR BLD: 88 % (ref 43–78)
NITRITE UR QL STRIP.AUTO: POSITIVE
NRBC # BLD: 0 K/UL (ref 0–0.2)
NRBC # BLD: 0 K/UL (ref 0–0.2)
PH UR STRIP: 6 [PH] (ref 5–9)
PLATELET # BLD AUTO: 215 K/UL (ref 150–450)
PLATELET # BLD AUTO: 238 K/UL (ref 150–450)
PMV BLD AUTO: 8.7 FL (ref 9.4–12.3)
PMV BLD AUTO: 8.9 FL (ref 9.4–12.3)
POTASSIUM SERPL-SCNC: 4.1 MMOL/L (ref 3.5–5.1)
PROCALCITONIN SERPL-MCNC: 3.62 NG/ML (ref 0–0.49)
PROT SERPL-MCNC: 7.4 G/DL (ref 6.3–8.2)
PROT UR STRIP-MCNC: 30 MG/DL
RBC # BLD AUTO: 2.68 M/UL (ref 4.05–5.2)
RBC # BLD AUTO: 3.24 M/UL (ref 4.05–5.2)
RBC #/AREA URNS HPF: 0 /HPF
SODIUM SERPL-SCNC: 134 MMOL/L (ref 136–145)
SOURCE, COVRS: ABNORMAL
SP GR UR REFRACTOMETRY: 1.02 (ref 1–1.02)
TROPONIN-HIGH SENSITIVITY: 1521.4 PG/ML (ref 0–14)
TROPONIN-HIGH SENSITIVITY: 1640.5 PG/ML (ref 0–14)
TROPONIN-HIGH SENSITIVITY: 1758.1 PG/ML (ref 0–14)
TROPONIN-HIGH SENSITIVITY: 2348.6 PG/ML (ref 0–14)
UROBILINOGEN UR QL STRIP.AUTO: 0.2 EU/DL (ref 0.2–1)
WBC # BLD AUTO: 10.7 K/UL (ref 4.3–11.1)
WBC # BLD AUTO: 7.6 K/UL (ref 4.3–11.1)
WBC URNS QL MICRO: >100 /HPF

## 2022-05-05 PROCEDURE — 74011250637 HC RX REV CODE- 250/637: Performed by: INTERNAL MEDICINE

## 2022-05-05 PROCEDURE — 97530 THERAPEUTIC ACTIVITIES: CPT

## 2022-05-05 PROCEDURE — 83880 ASSAY OF NATRIURETIC PEPTIDE: CPT

## 2022-05-05 PROCEDURE — 65270000046 HC RM TELEMETRY

## 2022-05-05 PROCEDURE — 93005 ELECTROCARDIOGRAM TRACING: CPT | Performed by: EMERGENCY MEDICINE

## 2022-05-05 PROCEDURE — 83735 ASSAY OF MAGNESIUM: CPT

## 2022-05-05 PROCEDURE — 86580 TB INTRADERMAL TEST: CPT | Performed by: INTERNAL MEDICINE

## 2022-05-05 PROCEDURE — 81015 MICROSCOPIC EXAM OF URINE: CPT

## 2022-05-05 PROCEDURE — 94761 N-INVAS EAR/PLS OXIMETRY MLT: CPT

## 2022-05-05 PROCEDURE — 36415 COLL VENOUS BLD VENIPUNCTURE: CPT

## 2022-05-05 PROCEDURE — 87086 URINE CULTURE/COLONY COUNT: CPT

## 2022-05-05 PROCEDURE — 74011250636 HC RX REV CODE- 250/636: Performed by: INTERNAL MEDICINE

## 2022-05-05 PROCEDURE — 87186 SC STD MICRODIL/AGAR DIL: CPT

## 2022-05-05 PROCEDURE — 84484 ASSAY OF TROPONIN QUANT: CPT

## 2022-05-05 PROCEDURE — 80053 COMPREHEN METABOLIC PANEL: CPT

## 2022-05-05 PROCEDURE — 74011000258 HC RX REV CODE- 258: Performed by: EMERGENCY MEDICINE

## 2022-05-05 PROCEDURE — 97162 PT EVAL MOD COMPLEX 30 MIN: CPT

## 2022-05-05 PROCEDURE — 71045 X-RAY EXAM CHEST 1 VIEW: CPT

## 2022-05-05 PROCEDURE — 74011000250 HC RX REV CODE- 250: Performed by: INTERNAL MEDICINE

## 2022-05-05 PROCEDURE — 84145 PROCALCITONIN (PCT): CPT

## 2022-05-05 PROCEDURE — 74011250636 HC RX REV CODE- 250/636: Performed by: EMERGENCY MEDICINE

## 2022-05-05 PROCEDURE — 74011000636 HC RX REV CODE- 636: Performed by: EMERGENCY MEDICINE

## 2022-05-05 PROCEDURE — 81003 URINALYSIS AUTO W/O SCOPE: CPT

## 2022-05-05 PROCEDURE — 71260 CT THORAX DX C+: CPT

## 2022-05-05 PROCEDURE — 96361 HYDRATE IV INFUSION ADD-ON: CPT

## 2022-05-05 PROCEDURE — 86140 C-REACTIVE PROTEIN: CPT

## 2022-05-05 PROCEDURE — 87040 BLOOD CULTURE FOR BACTERIA: CPT

## 2022-05-05 PROCEDURE — 83605 ASSAY OF LACTIC ACID: CPT

## 2022-05-05 PROCEDURE — 87635 SARS-COV-2 COVID-19 AMP PRB: CPT

## 2022-05-05 PROCEDURE — 99285 EMERGENCY DEPT VISIT HI MDM: CPT

## 2022-05-05 PROCEDURE — 99223 1ST HOSP IP/OBS HIGH 75: CPT | Performed by: INTERNAL MEDICINE

## 2022-05-05 PROCEDURE — 85025 COMPLETE CBC W/AUTO DIFF WBC: CPT

## 2022-05-05 PROCEDURE — 96365 THER/PROPH/DIAG IV INF INIT: CPT

## 2022-05-05 PROCEDURE — 87088 URINE BACTERIA CULTURE: CPT

## 2022-05-05 RX ORDER — ACETAMINOPHEN 650 MG/1
650 SUPPOSITORY RECTAL
Status: DISCONTINUED | OUTPATIENT
Start: 2022-05-05 | End: 2022-05-06 | Stop reason: HOSPADM

## 2022-05-05 RX ORDER — POLYETHYLENE GLYCOL 3350 17 G/17G
17 POWDER, FOR SOLUTION ORAL DAILY PRN
Status: DISCONTINUED | OUTPATIENT
Start: 2022-05-05 | End: 2022-05-06 | Stop reason: HOSPADM

## 2022-05-05 RX ORDER — METOPROLOL SUCCINATE 25 MG/1
25 TABLET, EXTENDED RELEASE ORAL DAILY
Status: DISCONTINUED | OUTPATIENT
Start: 2022-05-05 | End: 2022-05-06 | Stop reason: HOSPADM

## 2022-05-05 RX ORDER — SODIUM CHLORIDE 0.9 % (FLUSH) 0.9 %
10 SYRINGE (ML) INJECTION
Status: COMPLETED | OUTPATIENT
Start: 2022-05-05 | End: 2022-05-05

## 2022-05-05 RX ORDER — ONDANSETRON 2 MG/ML
4 INJECTION INTRAMUSCULAR; INTRAVENOUS
Status: DISCONTINUED | OUTPATIENT
Start: 2022-05-05 | End: 2022-05-06 | Stop reason: HOSPADM

## 2022-05-05 RX ORDER — ISOSORBIDE MONONITRATE 30 MG/1
30 TABLET, EXTENDED RELEASE ORAL DAILY
Status: DISCONTINUED | OUTPATIENT
Start: 2022-05-05 | End: 2022-05-06 | Stop reason: HOSPADM

## 2022-05-05 RX ORDER — ACETAMINOPHEN 325 MG/1
650 TABLET ORAL
Status: DISCONTINUED | OUTPATIENT
Start: 2022-05-05 | End: 2022-05-06 | Stop reason: HOSPADM

## 2022-05-05 RX ORDER — LEVOTHYROXINE SODIUM 88 UG/1
88 TABLET ORAL DAILY
Status: DISCONTINUED | OUTPATIENT
Start: 2022-05-05 | End: 2022-05-06 | Stop reason: HOSPADM

## 2022-05-05 RX ORDER — SODIUM CHLORIDE 0.9 % (FLUSH) 0.9 %
5-40 SYRINGE (ML) INJECTION AS NEEDED
Status: DISCONTINUED | OUTPATIENT
Start: 2022-05-05 | End: 2022-05-06 | Stop reason: HOSPADM

## 2022-05-05 RX ORDER — GUAIFENESIN/DEXTROMETHORPHAN 100-10MG/5
5 SYRUP ORAL
Status: DISCONTINUED | OUTPATIENT
Start: 2022-05-05 | End: 2022-05-06 | Stop reason: HOSPADM

## 2022-05-05 RX ORDER — SODIUM CHLORIDE 9 MG/ML
1000 INJECTION, SOLUTION INTRAVENOUS ONCE
Status: COMPLETED | OUTPATIENT
Start: 2022-05-05 | End: 2022-05-05

## 2022-05-05 RX ORDER — SODIUM CHLORIDE 0.9 % (FLUSH) 0.9 %
5-10 SYRINGE (ML) INJECTION EVERY 8 HOURS
Status: DISCONTINUED | OUTPATIENT
Start: 2022-05-05 | End: 2022-05-06 | Stop reason: HOSPADM

## 2022-05-05 RX ORDER — ONDANSETRON 4 MG/1
4 TABLET, ORALLY DISINTEGRATING ORAL
Status: DISCONTINUED | OUTPATIENT
Start: 2022-05-05 | End: 2022-05-06 | Stop reason: HOSPADM

## 2022-05-05 RX ORDER — HEPARIN SODIUM 5000 [USP'U]/ML
5000 INJECTION, SOLUTION INTRAVENOUS; SUBCUTANEOUS EVERY 8 HOURS
Status: DISCONTINUED | OUTPATIENT
Start: 2022-05-05 | End: 2022-05-06 | Stop reason: HOSPADM

## 2022-05-05 RX ORDER — SODIUM CHLORIDE 9 MG/ML
75 INJECTION, SOLUTION INTRAVENOUS CONTINUOUS
Status: DISCONTINUED | OUTPATIENT
Start: 2022-05-05 | End: 2022-05-06 | Stop reason: HOSPADM

## 2022-05-05 RX ORDER — SODIUM CHLORIDE 0.9 % (FLUSH) 0.9 %
5-40 SYRINGE (ML) INJECTION EVERY 8 HOURS
Status: DISCONTINUED | OUTPATIENT
Start: 2022-05-05 | End: 2022-05-06 | Stop reason: HOSPADM

## 2022-05-05 RX ORDER — SODIUM CHLORIDE 0.9 % (FLUSH) 0.9 %
5-10 SYRINGE (ML) INJECTION AS NEEDED
Status: DISCONTINUED | OUTPATIENT
Start: 2022-05-05 | End: 2022-05-06 | Stop reason: HOSPADM

## 2022-05-05 RX ADMIN — Medication 5 UNITS: at 12:04

## 2022-05-05 RX ADMIN — SODIUM CHLORIDE 75 ML/HR: 9 INJECTION, SOLUTION INTRAVENOUS at 15:23

## 2022-05-05 RX ADMIN — HEPARIN SODIUM 5000 UNITS: 5000 INJECTION INTRAVENOUS; SUBCUTANEOUS at 22:11

## 2022-05-05 RX ADMIN — IOPAMIDOL 100 ML: 755 INJECTION, SOLUTION INTRAVENOUS at 05:56

## 2022-05-05 RX ADMIN — SODIUM CHLORIDE 1000 ML: 9 INJECTION, SOLUTION INTRAVENOUS at 04:23

## 2022-05-05 RX ADMIN — SODIUM CHLORIDE, PRESERVATIVE FREE 10 ML: 5 INJECTION INTRAVENOUS at 15:27

## 2022-05-05 RX ADMIN — ONDANSETRON 4 MG: 2 INJECTION INTRAMUSCULAR; INTRAVENOUS at 09:24

## 2022-05-05 RX ADMIN — SODIUM CHLORIDE, PRESERVATIVE FREE 10 ML: 5 INJECTION INTRAVENOUS at 15:26

## 2022-05-05 RX ADMIN — CEFTRIAXONE 1 G: 1 INJECTION, POWDER, FOR SOLUTION INTRAMUSCULAR; INTRAVENOUS at 06:49

## 2022-05-05 RX ADMIN — Medication 10 ML: at 05:56

## 2022-05-05 RX ADMIN — LEVOTHYROXINE SODIUM 88 MCG: 0.09 TABLET ORAL at 12:03

## 2022-05-05 RX ADMIN — SODIUM CHLORIDE, PRESERVATIVE FREE 10 ML: 5 INJECTION INTRAVENOUS at 22:11

## 2022-05-05 RX ADMIN — ACETAMINOPHEN 650 MG: 325 TABLET ORAL at 21:24

## 2022-05-05 RX ADMIN — HEPARIN SODIUM 5000 UNITS: 5000 INJECTION INTRAVENOUS; SUBCUTANEOUS at 15:23

## 2022-05-05 RX ADMIN — SODIUM CHLORIDE 1000 ML: 9 INJECTION, SOLUTION INTRAVENOUS at 06:49

## 2022-05-05 RX ADMIN — SODIUM CHLORIDE 100 ML: 9 INJECTION, SOLUTION INTRAVENOUS at 05:56

## 2022-05-05 NOTE — ED PROVIDER NOTES
Patient with dementia hypothyroidism and heart disease. Since Monday has had slight cough congestion and shortness of breath. Getting worse with a fever this evening. Has had some confusion over baseline per family. Brought in for evaluation. The history is provided by the patient and a relative. No  was used. Shortness of Breath  This is a new problem. The average episode lasts 3 days. The problem occurs continuously. The current episode started more than 2 days ago. The problem has been gradually worsening. Associated symptoms include a fever, cough and chest pain (with cough). Pertinent negatives include no rhinorrhea, no sore throat, no wheezing, no vomiting, no abdominal pain, no leg pain and no leg swelling. She has tried nothing for the symptoms. Associated medical issues include CAD. Associated medical issues do not include COPD. Past Medical History:   Diagnosis Date    Dementia without behavioral disturbance ([facility] [address] 1/29/2019    Hypothyroidism, adult     hypothyroid    Unstable gait 7/8/2021       Past Surgical History:   Procedure Laterality Date    HX CATARACT REMOVAL      [email or web address]. cataract    HX CYSTOCELE REPAIR  2001    cystocele/ rectocele    HX HEART CATHETERIZATION      CATH 9/15/17    HX HYSTERECTOMY  1973    hysterectomy    HX RECTOCELE REPAIR  04/15/2019    Rectopexy         Family History:   Problem Relation Age of Onset    Cancer Mother         breast    Cancer Father         lung    Heart Disease Sister         CHF       Social History     Socioeconomic History    Marital status:      Spouse name: Not on file    Number of children: Not on file    Years of education: Not on file    Highest education level: Not on file   Occupational History    Not on file   Tobacco Use    Smoking status: Never Smoker    Smokeless tobacco: Never Used   Vaping Use    Vaping Use: Never used   Substance and Sexual Activity    Alcohol use: No    Drug use:  No  Sexual activity: Never     Birth control/protection: Surgical   Other Topics Concern    Not on file   Social History Narrative    Not on file     Social Determinants of Health     Financial Resource Strain:     Difficulty of Paying Living Expenses: Not on file   Food Insecurity:     Worried About Running Out of Food in the Last Year: Not on file    Deysi of Food in the Last Year: Not on file   Transportation Needs:     Lack of Transportation (Medical): Not on file    Lack of Transportation (Non-Medical): Not on file   Physical Activity:     Days of Exercise per Week: Not on file    Minutes of Exercise per Session: Not on file   Stress:     Feeling of Stress : Not on file   Social Connections:     Frequency of Communication with Friends and Family: Not on file    Frequency of Social Gatherings with Friends and Family: Not on file    Attends Church Services: Not on file    Active Member of [address] [place] [address] [employer] or Organizations: Not on file    Attends Club or Organization Meetings: Not on file    Marital Status: Not on file   Intimate Partner Violence:     Fear of Current or Ex-Partner: Not on file    Emotionally Abused: Not on file    Physically Abused: Not on file    Sexually Abused: Not on file   Housing Stability:     Unable to Pay for Housing in the Last Year: Not on file    Number of [place] in the Last Year: Not on file    Unstable Housing in the Last Year: Not on file         ALLERGIES: Lipitor [atorvastatin] and Penicillin g    Review of Systems   Unable to perform ROS: Dementia   Constitutional: Positive for fever. HENT: Negative for rhinorrhea and sore throat. Respiratory: Positive for cough and shortness of breath. Negative for wheezing. Cardiovascular: Positive for chest pain (with cough). Negative for leg swelling. Gastrointestinal: Negative for abdominal pain and vomiting.        Vitals:    05/05/22 0322   BP: (!) 143/69   Pulse: 98   Resp: 25   Temp: 100.1 °F (37.8 °C) SpO2: 92%   Weight: 68.5 kg (151 lb)   Height: 5' 2\" (1.575 m)            Physical Exam  Constitutional:       Appearance: She is well-developed. HENT:      Head: Normocephalic and atraumatic. Cardiovascular:      Rate and Rhythm: Normal rate and regular rhythm. Pulmonary:      Effort: Respiratory distress (mild increased effort) present. Breath sounds: Normal breath sounds. No wheezing. Abdominal:      General: Bowel sounds are normal.      Palpations: Abdomen is soft. Tenderness: There is no abdominal tenderness. Musculoskeletal:         General: No swelling. Normal range of motion. Cervical back: Normal range of motion and neck supple. Skin:     General: Skin is warm and dry. Neurological:      General: No focal deficit present. Mental Status: She is alert. Mental status is at baseline. MDM  Number of Diagnoses or Management Options  Diagnosis management comments: Patient with a history of atrial fibrillation rate controlled here. Has a low-grade temp of 100.1. Chest x-ray shows bilateral interstitial densities. Patient is COVID-positive. Pro-Fabián is elevated at 3.62. She has a UTI. Her troponin has increased from 9538-4623. She has a history of dementia. CT is ordered to evaluate for possible PE. Will admit for further treatment and evaluation. Amount and/or Complexity of Data Reviewed  Clinical lab tests: ordered and reviewed  Tests in the radiology section of CPT®: ordered and reviewed  Tests in the medicine section of CPT®: ordered and reviewed    Patient Progress  Patient progress: stable         Procedures        EKG: nonspecific ST and T waves changes, atrial fibrillation, rate 98. XR CHEST PORT (Final result)  Result time 05/05/22 04:22:42  Final result by Marsha Jones MD (05/05/22 04:22:42)                Impression:      1. Bilateral interstitial densities, suspicious for mild pulmonary edema. 2. Stable cardiomegaly. Narrative:    Portable chest xray       COMPARISON: June 2018     INDICATION: Shortness of breath     FINDINGS:     There are bilateral interstitial densities. Persistent elevation of the left   hemidiaphragm. No pneumothorax or large pleural effusion. Heart is enlarged. Mediastinal contour is within normal limits.                     Results Include:    Recent Results (from the past 24 hour(s))   PROCALCITONIN    Collection Time: 05/05/22  3:31 AM   Result Value Ref Range    Procalcitonin 3.62 (H) 0.00 - 0.49 ng/mL   CBC WITH AUTOMATED DIFF    Collection Time: 05/05/22  3:32 AM   Result Value Ref Range    WBC 10.7 4.3 - 11.1 K/uL    RBC 3.24 (L) 4.05 - 5.2 M/uL    HGB 10.0 (L) 11.7 - 15.4 g/dL    HCT 30.9 (L) 35.8 - 46.3 %    MCV 95.4 79.6 - 97.8 FL    MCH 30.9 26.1 - 32.9 PG    MCHC 32.4 31.4 - 35.0 g/dL    RDW 13.3 11.9 - 14.6 %    PLATELET 546 952 - 395 K/uL    MPV 8.7 (L) 9.4 - 12.3 FL    ABSOLUTE NRBC 0.00 0.0 - 0.2 K/uL    DF AUTOMATED      NEUTROPHILS 88 (H) 43 - 78 %    LYMPHOCYTES 2 (L) 13 - 44 %    MONOCYTES 8 4.0 - 12.0 %    EOSINOPHILS 0 (L) 0.5 - 7.8 %    BASOPHILS 0 0.0 - 2.0 %    IMMATURE GRANULOCYTES 1 0.0 - 5.0 %    ABS. NEUTROPHILS 9.5 (H) 1.7 - 8.2 K/UL    ABS. LYMPHOCYTES 0.2 (L) 0.5 - 4.6 K/UL    ABS. MONOCYTES 0.9 0.1 - 1.3 K/UL    ABS. EOSINOPHILS 0.0 0.0 - 0.8 K/UL    ABS. BASOPHILS 0.0 0.0 - 0.2 K/UL    ABS. IMM.  GRANS. 0.1 0.0 - 0.5 K/UL   METABOLIC PANEL, COMPREHENSIVE    Collection Time: 05/05/22  3:32 AM   Result Value Ref Range    Sodium 134 (L) 136 - 145 mmol/L    Potassium 4.1 3.5 - 5.1 mmol/L    Chloride 103 98 - 107 mmol/L    CO2 25 21 - 32 mmol/L    Anion gap 6 (L) 7 - 16 mmol/L    Glucose 175 (H) 65 - 100 mg/dL    BUN 40 (H) 8 - 23 MG/DL    Creatinine 1.70 (H) 0.6 - 1.0 MG/DL    GFR est AA 37 (L) >60 ml/min/1.73m2    GFR est non-AA 30 (L) >60 ml/min/1.73m2    Calcium 8.5 8.3 - 10.4 MG/DL    Bilirubin, total 0.5 0.2 - 1.1 MG/DL    ALT (SGPT) 16 12 - 65 U/L    AST (SGOT) 22 15 - 37 U/L    Alk.  phosphatase 79 50 - 136 U/L    Protein, total 7.4 6.3 - 8.2 g/dL    Albumin 3.3 3.2 - 4.6 g/dL    Globulin 4.1 (H) 2.3 - 3.5 g/dL    A-G Ratio 0.8 (L) 1.2 - 3.5     MAGNESIUM    Collection Time: 05/05/22  3:32 AM   Result Value Ref Range    Magnesium 2.2 1.8 - 2.4 mg/dL   TROPONIN-HIGH SENSITIVITY    Collection Time: 05/05/22  3:32 AM   Result Value Ref Range    Troponin-High Sensitivity 1,521.4 (HH) 0 - 14 pg/mL   LACTIC ACID    Collection Time: 05/05/22  4:19 AM   Result Value Ref Range    Lactic acid 1.4 0.4 - 2.0 MMOL/L   COVID-19 RAPID TEST    Collection Time: 05/05/22  4:19 AM   Result Value Ref Range    Specimen source NASAL      COVID-19 rapid test Detected (AA) NOTD     TROPONIN-HIGH SENSITIVITY    Collection Time: 05/05/22  4:59 AM   Result Value Ref Range    Troponin-High Sensitivity 1,640.5 (HH) 0 - 14 pg/mL   URINALYSIS W/ RFLX MICROSCOPIC    Collection Time: 05/05/22  4:59 AM   Result Value Ref Range    Color YELLOW      Appearance CLOUDY      Specific gravity 1.020 1.001 - 1.023      pH (UA) 6.0 5.0 - 9.0      Protein 30 (A) NEG mg/dL    Glucose Negative mg/dL    Ketone Negative NEG mg/dL    Bilirubin Negative NEG      Blood MODERATE (A) NEG      Urobilinogen 0.2 0.2 - 1.0 EU/dL    Nitrites Positive (A) NEG      Leukocyte Esterase LARGE (A) NEG     URINE MICROSCOPIC    Collection Time: 05/05/22  4:59 AM   Result Value Ref Range    WBC >100 0 /hpf    RBC 0 0 /hpf    Epithelial cells 0-3 0 /hpf    Bacteria 4+ (H) 0 /hpf    Casts 0 0 /lpf    Crystals, urine 0 0 /LPF    Mucus 0 0 /lpf

## 2022-05-05 NOTE — PROGRESS NOTES
TRANSFER - IN REPORT:    Verbal report received from [address], RN(name) on Tara Garcia  being received from ER(unit) for routine progression of care      Report consisted of patients Situation, Background, Assessment and   Recommendations(SBAR). Information from the following report(s) SBAR, Kardex, [facility] and Recent Results was reviewed with the receiving nurse. Opportunity for questions and clarification was provided. Report given to Henry Adkins RN, who will assume primary care of patient on arrival to floor.

## 2022-05-05 NOTE — PROGRESS NOTES
Pt arrived to room 835 from ED via stretcher. Daughter at bedside. Pt alert and oriented to person and place. 1L NC in place. No s/sx of distress noted. Dual skin assessment done with Marquis Del Toro. No significant findings. No pressure injuries noted. Denies any pain. Pt oriented to room and surroundings. Call light explained and placed in reach of patient. Encouraged to call for assistance as needed. Call light wihtin reach. Will continue to monitor.

## 2022-05-05 NOTE — ACP (ADVANCE CARE PLANNING)
[facility] Hospitalist Service  At the heart of better care     Advance Care Planning   Admit Date:  2022  3:11 AM   Name:  Baldo Das   Age:  80 y.o. Sex:  female  :  1935   MRN:  [id number]   Room:  [place] [address]    Baldo Das is able to make her own decisions: No (acute encephalopathy)    If pt unable to make decisions, POA/surrogate decision maker:  Pj Fernandez (daughter) -- [phone]    Other members present in the meeting:   N/A    Patient / surrogate decision-maker directed:  Code Status: DO NOT RESUSCITATE, DO NOT INTUBATE -okay for other aggressive medical and surgical intervention    Other ACP topics discussed, if applicable:   N/A    Patient or surrogate consented to discussion of the current conditions, workup, management plans, prognosis, and understand the risk for further deterioration. Time spent: 16 minutes in direct discussion (face to face and/or over phone).     Signed:  Anai Hicks MD

## 2022-05-05 NOTE — H&P
[facility] Cardiology Initial Cardiac Evaluation                 Date of  Admission: 5/5/2022  3:11 AM     Primary Care Physician: Dr. Ashly Macias   Primary Cardiologist: Dr. Antony Ann   Referring Physician: Hospitalist   Supervising Cardiologist: Dr. Antony Ann     Reason for cardiac evaluation: elevated trop       Suzanne Silverman is a 80 y.o. female with prior h/o permanent A. Fib (no AC in past d/t rectal prolapse/bleeding), severe MS, HTN, dementia, CAD s/p LHC/RHC in 2017 showed severe multivessel CAD and severe MS (saw CTS and decision made to medically manage in 2017). Patient presented to ED at [facility] with c/o SOB. Her dgt reported URI sx started Sunday then patient developed poor po intake and weakness. Her daughter pushed PO fluids and says the patient perked up. However, over the next few days she had ongoing coughing fits a/w SOB and some confusion more so than is usual for her. She had a fever around 101F earlier this morning. Daughter was likely exposed to COVID-19 by a co-worker this past Friday and that is possibly how her mom got it. She is vaccinated x2 but overdue for her booster. In ED, O2 sat 92-95% on RA. EKG showed AFib. Labs showed sCr 1.7, procalcitonin 3.62, LA 1.4 and troponin 1,521 and 1,640 on repeat. Her rapid COVID swab is positive. UA with pyuria. CXR showed stable cardiomegaly and bilateral interstitial densities concerning for mild pulmonary edema. CT chest was negative for PE. Hospitalist admitted patient with  JENNIFER, elevated trop, cystitis, + covid and AMS. Cardiology was consulted for elevated trop.  Dgt at bedside and doesn't want any aggressive measures.        Patient Active Problem List   Diagnosis Code    Hypothyroidism, adult E03.9    Osteoarthrosis, unspecified whether generalized or localized, shoulder region M19.019    Prediabetes R73.03    Dyslipidemia E78.5    Osteopenia with high risk of fracture M85.80    Benign hypertension with CKD (chronic kidney disease) stage IV ([facility] [address] I12.9, I19.3    Systolic murmur Q43.1    LAE (left atrial enlargement)-Severe I51.7    Mitral valve annular calcification I05.9    Weakness of both lower extremities R29.898    Hearing loss of right ear H91.91    Dementia without behavioral disturbance (HCC) F03.90    Numbness and tingling of both legs R20.0, R20.2    Chronic atrial fibrillation (HCC) I48.20    Unstable gait R26.81    Acute cystitis N30.00    Elevated troponin R77.8    JENNIFER (acute kidney injury) ([facility] [address] N17.9    Lab test positive for detection of COVID-19 virus U07.1    Acute metabolic encephalopathy S56.46    Chest pain R07.9       Past Medical History:   Diagnosis Date    Dementia without behavioral disturbance ([facility] 1/29/2019    Hypothyroidism, adult     hypothyroid    Severe mitral stenosis by prior echocardiogram     Unstable gait 7/8/2021      Past Surgical History:   Procedure Laterality Date    HX CATARACT REMOVAL      [email or web address].  cataract    HX CYSTOCELE REPAIR  2001    cystocele/ rectocele    HX HEART CATHETERIZATION      CATH 9/15/17    HX HYSTERECTOMY  1973    hysterectomy    HX RECTOCELE REPAIR  04/15/2019    Rectopexy     Allergies   Allergen Reactions    Lipitor [Atorvastatin] Other (comments)     Muscle aches    Penicillin G Rash      Family History   Problem Relation Age of Onset    Cancer Mother         breast    Cancer Father         lung    Heart Disease Sister         CHF      Social History     Socioeconomic History    Marital status:      Spouse name: Not on file    Number of children: Not on file    Years of education: Not on file    Highest education level: Not on file   Occupational History    Not on file   Tobacco Use    Smoking status: Never Smoker    Smokeless tobacco: Never Used   Vaping Use    Vaping Use: Never used   Substance and Sexual Activity    Alcohol use: No    Drug use: No    Sexual activity: Never     Birth control/protection: Surgical   Other Topics Concern    Not on file   Social History Narrative    Not on file     Social Determinants of Health     Financial Resource Strain:     Difficulty of Paying Living Expenses: Not on file   Food Insecurity:     Worried About Running Out of Food in the Last Year: Not on file    Deysi of Food in the Last Year: Not on file   Transportation Needs:     Lack of Transportation (Medical): Not on file    Lack of Transportation (Non-Medical):  Not on file   Physical Activity:     Days of Exercise per Week: Not on file    Minutes of Exercise per Session: Not on file   Stress:     Feeling of Stress : Not on file   Social Connections:     Frequency of Communication with Friends and Family: Not on file    Frequency of Social Gatherings with Friends and Family: Not on file    Attends Temple Services: Not on file    Active Member of Clubs or Organizations: Not on file    Attends Club or Organization Meetings: Not on file    Marital Status: Not on file   Intimate Partner Violence:     Fear of Current or Ex-Partner: Not on file    Emotionally Abused: Not on file    Physically Abused: Not on file    Sexually Abused: Not on file   Housing Stability:     Unable to Pay for Housing in the Last Year: Not on file    Number of Places Lived in the Last Year: Not on file    Unstable Housing in the Last Year: Not on file       Current Facility-Administered Medications   Medication Dose Route Frequency    sodium chloride (NS) flush 5-10 mL  5-10 mL IntraVENous Q8H    sodium chloride (NS) flush 5-10 mL  5-10 mL IntraVENous PRN    levothyroxine (SYNTHROID) tablet 88 mcg  88 mcg Oral DAILY    sodium chloride (NS) flush 5-40 mL  5-40 mL IntraVENous Q8H    sodium chloride (NS) flush 5-40 mL  5-40 mL IntraVENous PRN    acetaminophen (TYLENOL) tablet 650 mg  650 mg Oral Q6H PRN    Or    acetaminophen (TYLENOL) suppository 650 mg  650 mg Rectal Q6H PRN    polyethylene glycol (MIRALAX) packet 17 g  17 g Oral DAILY PRN    ondansetron (Sarita Harms ODT) tablet 4 mg  4 mg Oral Q8H PRN    Or    ondansetron (ZOFRAN) injection 4 mg  4 mg IntraVENous Q6H PRN    heparin (porcine) injection 5,000 Units  5,000 Units SubCUTAneous Q8H    [START ON 5/6/2022] cefTRIAXone (ROCEPHIN) 1 g in 0.9% sodium chloride (MBP/ADV) 50 mL MBP  1 g IntraVENous Q24H    tuberculin injection 5 Units  5 Units IntraDERMal ONCE    0.9% sodium chloride infusion  75 mL/hr IntraVENous CONTINUOUS     Current Outpatient Medications   Medication Sig    levothyroxine (SYNTHROID) 88 mcg tablet Take 1 Tablet by mouth daily. Generic OK, \"NAME ALERT\"  Indications: a condition with low thyroid hormone levels    nitroglycerin (NITROSTAT) 0.4 mg SL tablet 1 Tablet by SubLINGual route every five (5) minutes as needed for Chest Pain.  lisinopril-hydroCHLOROthiazide (PRINZIDE, ZESTORETIC) 20-12.5 mg per tablet Take 1 Tablet by mouth daily.  GARLIC PO Take  by mouth.  cholecalciferol (VITAMIN D3) (2,000 UNITS /50 MCG) cap capsule Take 2,000 Units by mouth daily.  Calcium-Cholecalciferol, D3, (CALCIUM 600 WITH VITAMIN D3) 600 mg(1,500mg) -400 unit cap Take 1 Cap by mouth daily.  TYLENOL 325 mg Tab take 650 mg by mouth every four (4) hours as needed for Pain.  FORMULA MULTIVIT & MINERAL PO Take  by mouth daily. Limited d/t dementia  Review of Systems   Cardiovascular: Positive for chest pain (per daughter ). Psychiatric/Behavioral: Positive for altered mental status (with underlying dementia ).         Physical Exam  Vitals:    05/05/22 0322   BP: (!) 143/69   Pulse: 98   Resp: 25   Temp: 100.1 °F (37.8 °C)   SpO2: 92%   Weight: 68.5 kg (151 lb)   Height: 5' 2\" (1.575 m)       Physical Exam:  General: Well Developed, pleasantly confused   HEENT: pupils equal and round, no abnormalities noted  Neck: supple, no JVD, no carotid bruits  Heart: S1S2 irregular with + murmur   Lungs: faint exp wheeze   Abd: soft, nontender, nondistended, with good bowel sounds  Ext: warm, no edema, calves supple/nontender, pulses 2+ bilaterally  Skin: warm and dry  Psychiatric: Normal mood and affect  Neurologic: pleasantly confused with dementia     Cardiographics    Telemetry: A.fib rate controlled   ECG: A. Fib rate controlled   Echocardiogram: ordered by primary    Labs:   Recent Labs     05/05/22  0332   *   K 4.1   MG 2.2   BUN 40*   CREA 1.70*   *   WBC 10.7   HGB 10.0*   HCT 30.9*           Assessment/Plan:     Assessment:      Principal Problem:    JENNIFER (acute kidney injury) ([address] (5/5/2022)- per primary team likely from poor po intake d/t covid. Active Problems:    Hypothyroidism, adult (7/21/2015)      Dyslipidemia (12/8/2016)      Benign hypertension with CKD (chronic kidney disease) stage IV (HCC) (1/17/2017)      LAE (left atrial enlargement)-Severe (7/27/2017)      Mitral valve annular calcification (7/27/2017)      Dementia without behavioral disturbance ([address] (1/29/2019)      Chronic atrial fibrillation ([address] (11/19/2020)- No ac due rectal bleeding in past       Overview: Followed by Dr. Marisabel Whitaker. Not tx'd w/ meds; stable. 11/2020. Acute cystitis (5/5/2022)      Elevated troponin (5/5/2022)- known CAD; multivessel disease in 2017 with severe MS. Will continue medical treatment as noted back in 2017. Dgt request no aggressive treatment. Will add low dose Imdur and BB for now. Lab test positive for detection of COVID-19 virus (5/5/2022)      Acute metabolic encephalopathy (9/5/0363)      Chest pain (5/5/2022)- see above. We appreciate the opportunity to participate in this patient's care. Maria Teresa Ching, NP  Supervising MD: Dr. Tank Jackson     5/5/2022     10:46 AM    I have personally seen and examined Jose Finnegan .     am  5/5/2022 10:46 AM    Admit Date: 5/5/2022    Admit Diagnosis: JENNIFER (acute kidney injury) ([address] [N17.9]  Elevated troponin [R77.8]  Acute cystitis [N30.00]      Subjective:    Patient admitted with COVID and respiratory issues has longstanding history of coronary artery disease and mitral valve disease. She is not a candidate for any intervention or any invasive work-up.     Objective:      Visit Vitals  BP (!) 143/69 (BP 1 Location: Right upper arm, BP Patient Position: At rest)   Pulse 98   Temp 100.1 °F (37.8 °C)   Resp 25   Ht 5' 2\" (1.575 m)   Wt 151 lb (68.5 kg)   SpO2 92%   BMI 27.62 kg/m²       ROS:  reviewed and verified  Physical Exam:    Physical Examination: General appearance - alert, well appearing, and in no distress  Chest/CV - decreased air entry noted    Heart - irregularly irregular rhythm  Abdomen/GI - soft, nontender, nondistended, no masses or organomegaly  Extremities - peripheral pulses normal, no pedal edema, no clubbing or cyanosis      Current Facility-Administered Medications   Medication Dose Route Frequency    sodium chloride (NS) flush 5-10 mL  5-10 mL IntraVENous Q8H    sodium chloride (NS) flush 5-10 mL  5-10 mL IntraVENous PRN    levothyroxine (SYNTHROID) tablet 88 mcg  88 mcg Oral DAILY    sodium chloride (NS) flush 5-40 mL  5-40 mL IntraVENous Q8H    sodium chloride (NS) flush 5-40 mL  5-40 mL IntraVENous PRN    acetaminophen (TYLENOL) tablet 650 mg  650 mg Oral Q6H PRN    Or    acetaminophen (TYLENOL) suppository 650 mg  650 mg Rectal Q6H PRN    polyethylene glycol (MIRALAX) packet 17 g  17 g Oral DAILY PRN    ondansetron (ZOFRAN ODT) tablet 4 mg  4 mg Oral Q8H PRN    Or    ondansetron (ZOFRAN) injection 4 mg  4 mg IntraVENous Q6H PRN    heparin (porcine) injection 5,000 Units  5,000 Units SubCUTAneous Q8H    [START ON 5/6/2022] cefTRIAXone (ROCEPHIN) 1 g in 0.9% sodium chloride (MBP/ADV) 50 mL MBP  1 g IntraVENous Q24H    tuberculin injection 5 Units  5 Units IntraDERMal ONCE    0.9% sodium chloride infusion  75 mL/hr IntraVENous CONTINUOUS    isosorbide mononitrate ER (IMDUR) tablet 30 mg  30 mg Oral DAILY    metoprolol succinate (TOPROL-XL) XL tablet 25 mg  25 mg Oral DAILY     Current Outpatient Medications   Medication Sig    levothyroxine (SYNTHROID) 88 mcg tablet Take 1 Tablet by mouth daily. Generic OK, \"NAME ALERT\"  Indications: a condition with low thyroid hormone levels    nitroglycerin (NITROSTAT) 0.4 mg SL tablet 1 Tablet by SubLINGual route every five (5) minutes as needed for Chest Pain.  lisinopril-hydroCHLOROthiazide (PRINZIDE, ZESTORETIC) 20-12.5 mg per tablet Take 1 Tablet by mouth daily.  GARLIC PO Take  by mouth.  cholecalciferol (VITAMIN D3) (2,000 UNITS /50 MCG) cap capsule Take 2,000 Units by mouth daily.  Calcium-Cholecalciferol, D3, (CALCIUM 600 WITH VITAMIN D3) 600 mg(1,500mg) -400 unit cap Take 1 Cap by mouth daily.  TYLENOL 325 mg Tab take 650 mg by mouth every four (4) hours as needed for Pain.  FORMULA MULTIVIT & MINERAL PO Take  by mouth daily. Data Review:   @LABRCNT(Na,K,BUN,CREA,WBC,HGB,HCT,PLT,INR,TRP,TCHOL*,Triglyceride*,LDL*,LDLCPOC HDL*,HDL])@    TELEMETRY:      Assessment/Plan:     Principal Problem:    JENNIFER (acute kidney injury) ([facility] (5/5/2022)    Active Problems:    Hypothyroidism, adult (7/21/2015)      Dyslipidemia (12/8/2016)      Benign hypertension with CKD (chronic kidney disease) stage IV (HCC) (1/17/2017)      LAE (left atrial enlargement)-Severe (7/27/2017)      Mitral valve annular calcification (7/27/2017)      Dementia without behavioral disturbance (HCC) (1/29/2019)      Chronic atrial fibrillation ([facility] (11/19/2020)      Overview: Followed by Dr. Lazaro Mehta. Not tx'd w/ meds; stable. 11/2020. Acute cystitis (5/5/2022)      Elevated troponin (5/5/2022)      Lab test positive for detection of COVID-19 virus (5/5/2022)      Acute metabolic encephalopathy (3/7/4673)      Chest pain (5/5/2022)      Admit and supportive care for COVID-pneumonia. There is really no role for any aggressive and certainly no invasive cardiac work-up.   Echo was not needed but has been ordered and can be obtained but there will likely be no actionable information obtained from this. Raiza Sewell MD    I have reviewed this note in its entirety and edited where appropriate. I have performed an independent exam . All portions of the E/M have been edited and reviewed prior to signing note.       Raiza Sewell MD

## 2022-05-05 NOTE — ED NOTES
TRANSFER - OUT REPORT:    Verbal report given to Jeannette(name) on Tara Garcia  being transferred to [facility](unit) for routine progression of care       Report consisted of patients Situation, Background, Assessment and   Recommendations(SBAR). Information from the following report(s) SBAR was reviewed with the receiving nurse. Lines:   Peripheral IV 05/05/22 Anterior; Left Forearm (Active)        Opportunity for questions and clarification was provided.       Patient transported with:   O2 @ 2 liters  Tech

## 2022-05-05 NOTE — PROGRESS NOTES
ACUTE PHYSICAL THERAPY GOALS:  (Developed with and agreed upon by patient and/or caregiver. )  LTG:  (1.)Ms. Garcia will move from supine to sit and sit to supine , scoot up and down and roll side to side in bed with INDEPENDENT within 7 treatment day(s). (2.)Ms. Garcia will transfer from bed to chair and chair to bed with SUPERVISION using the least restrictive device within 7 treatment day(s). (3.)Ms. Garcia will ambulate with STAND BY ASSIST for 150+ feet with the least restrictive device within 7 treatment day(s). ________________________________________________________________________________________________        PHYSICAL THERAPY ASSESSMENT: Initial Assessment and AM PT Treatment Day # 1      Sabine Vargas is a 80 y.o. female   PRIMARY DIAGNOSIS: JENNIFER (acute kidney injury) ([facility]  JENNIFER (acute kidney injury) ([facility] [N17.9]  Elevated troponin [R77.8]  Acute cystitis [N30.00]       Reason for Referral:    ICD-10: Treatment Diagnosis: Generalized Muscle Weakness (M62.81)  Difficulty in walking, Not elsewhere classified (R26.2)  [place] / [place] / Product Type: Medicare /     ASSESSMENT:     REHAB RECOMMENDATIONS:   Recommendation to date pending progress:  Setting:   No further skilled therapy after discharge from hospital  Equipment:    To Be Determined     PRIOR LEVEL OF FUNCTION:  (Prior to Hospitalization) INITIAL/CURRENT LEVEL OF FUNCTION:  (Most Recently Demonstrated)   Bed Mobility:   Independent  Sit to Stand:   Supervision  Transfers:   Supervision  Gait/Mobility:  Murrel Neither Standby Assistance Bed Mobility:   Minimal Assistance  Sit to Stand:   Minimal Assistance  Transfers:   Minimal Assistance  Gait/Mobility:   Not tested     ASSESSMENT:  Ms. Patrick Schumacher was supine at arrival with dtr present. Pt required dtr to answer data qwuestions. She was able to get to EOB and stand with Belen limited by lines. Extra time spent with dtr cleaning up pt and bed. returned to bed following session. Unable to ambulate in ED due to their constraints. Pt will benefit from skilled and sophisticated PT to help improve impairments. SUBJECTIVE:   Ms. Domi Lopez states, \"I live with my doggie\"    SOCIAL HISTORY/LIVING ENVIRONMENT: lives in home with dtr. Uses RW, some asst with ADLs from dtr. dtr works and staff is present when she is gone, pt also gets meals on wheels during week.      OBJECTIVE:     PAIN: VITAL SIGNS: LINES/DRAINS:   Pre Treatment: Pain Screen  Pain Scale 1: Numeric (0 - 10)  Pain Intensity 1: 0  Post Treatment: 0   Kahn Catheter, IV and Purewick  O2 Device: None (Room air)     GROSS EVALUATION:   Within Functional Limits Abnormal/ Functional Abnormal/ Non-Functional (see comments) Not Tested Comments:   AROM [x] [] [] []    PROM [] [] [] []    Strength [x] [] [] []    Balance [] [x] [] []    Posture [x] [] [] []    Sensation [x] [] [] []    Coordination [x] [] [] []    Tone [] [] [] []    Edema [] [] [] []    Activity Tolerance [] [x] [] []     [] [] [] []      COGNITION/  PERCEPTION: Intact Impaired   (see comments) Comments:   Orientation [x] [] But confused   Vision [x] []    Hearing [x] []    Command Following [x] []    Safety Awareness [x] []     [] []      MOBILITY: I Mod I S SBA CGA Min Mod Max Total  NT x2 Comments:   Bed Mobility    Rolling [] [] [] [] [] [x] [] [] [] [] []    Supine to Sit [] [] [] [] [] [x] [] [] [] [] []    Scooting [] [] [] [] [] [x] [] [] [] [] []    Sit to Supine [] [] [] [] [] [x] [] [] [] [] []    Transfers    Sit to Stand [] [] [] [] [] [x] [] [] [] [] []    Bed to Chair [] [] [] [] [] [] [] [] [] [x] []    Stand to Sit [] [] [] [] [] [x] [] [] [] [] []    I=Independent, Mod I=Modified Independent, S=Supervision, SBA=Standby Assistance, CGA=Contact Guard Assistance,   Min=Minimal Assistance, Mod=Moderate Assistance, Max=Maximal Assistance, Total=Total Assistance, NT=Not Tested  GAIT: I Mod I S SBA CGA Min Mod Max Total  NT x2 Comments:   Level of Assistance [] [] [] [] [] [] [] [] [] [x] []    Distance      DME Rolling Walker    Gait Quality      Weightbearing Status N/A     I=Independent, Mod I=Modified Independent, S=Supervision, SBA=Standby Assistance, CGA=Contact Guard Assistance,   Min=Minimal Assistance, Mod=Moderate Assistance, Max=Maximal Assistance, Total=Total Assistance, NT=Not [facility] Form       How much difficulty does the patient currently have. .. Unable A Lot A Little None   1. Turning over in bed (including adjusting bedclothes, sheets and blankets)? [] 1   [] 2   [x] 3   [] 4   2. Sitting down on and standing up from a chair with arms ( e.g., wheelchair, bedside commode, etc.)   [] 1   [] 2   [x] 3   [] 4   3. Moving from lying on back to sitting on the side of the bed? [] 1   [] 2   [x] 3   [] 4   How much help from another person does the patient currently need. .. Total A Lot A Little None   4. Moving to and from a bed to a chair (including a wheelchair)? [] 1   [] 2   [x] 3   [] 4   5. Need to walk in hospital room? [] 1   [] 2   [x] 3   [] 4   6. Climbing 3-5 steps with a railing? [] 1   [] 2   [x] 3   [] 4   © 2007, Trustees of [address] [place] [address], under license to [employer]. All rights reserved     Score:  Initial: 18 Most Recent: X (Date: -- )    Interpretation of Tool:  Represents activities that are increasingly more difficult (i.e. Bed mobility, Transfers, Gait). PLAN:   FREQUENCY/DURATION: PT Plan of Care: 3 times/week for duration of hospital stay or until stated goals are met, whichever comes first.    PROBLEM LIST:   (Skilled intervention is medically necessary to address:)  1. Decreased ADL/Functional Activities  2. Decreased Activity Tolerance  3. Decreased AROM/PROM  4. Decreased Balance  5. Decreased Cognition  6. Decreased Coordination  7. Decreased Gait Ability  8. Decreased Strength  9.  Decreased Transfer Abilities   INTERVENTIONS PLANNED:   (Benefits and precautions of physical therapy have been discussed with the patient.)  1. Therapeutic Activity  2. Therapeutic Exercise/HEP  3. Neuromuscular Re-education  4. Gait Training  5. Education     TREATMENT:     EVALUATION: Moderate Complexity : (Untimed Charge)    TREATMENT:   ($$ Therapeutic Activity: 8-22 mins    )  Therapeutic Activity (10 Minutes): Therapeutic activity included Rolling, Supine to Sit, Sit to Supine, Scooting, Lateral Scooting, Transfer Training, Sitting balance  and Standing balance to improve functional Mobility, Strength and Activity tolerance.     TREATMENT GRID:  N/A    AFTER TREATMENT POSITION/PRECAUTIONS:  Bed, Needs within reach and RN notified    INTERDISCIPLINARY COLLABORATION:  RN/PCT and PT/PTA    TOTAL TREATMENT DURATION:  PT Patient Time In/Time Out  Time In: 1020  Time Out: 1050    Robert Lopes DPT

## 2022-05-05 NOTE — H&P
Hospitalist History and Physical   Admit Date:  2022  3:11 AM   Name:  Alejandro Mary   Age:  80 y.o. Sex:  female  :  1935   MRN:  [id number]   Room:  ER07    Presenting Complaint: Shortness of Breath    Reason(s) for Admission: JENNIFER (acute kidney injury) ([facility] [address] [N17.9]  Elevated troponin [R77.8]  Acute cystitis [N30.00]     History of Present Illness: Alejandro Mary is a 80 y.o. female with medical history of severe mitral stenosis, atrial fibrillation and HLD who presented to the ER this morning with c/o SOB. She is with her daughter who provides the history. Patient developed URI symptoms this past . Her oral intake decreased some and she became weak. Her daughter pushed PO fluids and says the patient perked up. However, over the next few days she had ongoing coughing fits a/w SOB and some confusion more so than is usual for her. She had a fever around 101F earlier this morning. Apparently during one of her coughing spells she coughed up a mucous plug and seemed to be breathing better after that. Daughter was likely exposed to COVID-19 by a co-worker this past Friday and that is possibly how her mom got it. She is vaccinated x2 but overdue for her booster. Tm here is 100.1F, VS otherwise unremarkable. She is on RA with a resting sat of 92-95%. EKG showed AFib but no changes to suggest acute ischemia. Labs notable for sCr 1.7, procalcitonin 3.62, LA 1.4 and troponin 1,521 and 1,640 on repeat. Her rapid COVID swab is positive. UA with pyuria. CXR showed stable cardiomegaly and bilateral interstitial densities concerning for mild pulmonary edema. CT chest was negative for PE and showed some cardiomegaly and small LLL atelectasis w/o overt evidence for infiltrate, effusion or pulmonary edema. She was given Rocephin and cultures collected. Review of Systems:  10 systems reviewed and negative except as noted in HPI. Assessment & Plan:   # JENNIFER   - Baseline sCr around 1.3-1.4.  Currently 1.7.    - Likely pre-renal with reduced PO intake with ongoing ACE/diuretic use. Hold BP meds, IVFs, BMP tomorrow. # Elevated troponin   - No chest pain. EKG AFib w/o changes to suggest acute ischemia. Suspect demand ischemia in the setting of COVID, UTI and JENNIFER. Will trend further, consult Cardiology and check echo. # Acute uncomplicated cystitis   - Given Rocephin in ER which we will continue. Blood cultures and urine culture ordered. # COVID +   - On RA and no documented hypoxia so will hold on any direct treatments for now. # Acute metabolic encephalopathy   - 2/2 COVID, UTI, acute illness etc.    # Atrial fibrillation   - Not on meds, rate controlled. # Severe mitral stenosis   - Follows with [facility] outpatient. Repeating echo as above. # Chronic normocytic anemia    Dispo/Discharge Planning: Pending. PT/OT/PPD added. Diet: No diet orders on file  VTE ppx: Heparin SQ  Code status: Prior    Hospital Problems as of 5/5/2022 Date Reviewed: 5/5/2022          Codes Class Noted - Resolved POA    Acute cystitis ICD-10-CM: N30.00  ICD-9-CM: 595.0  5/5/2022 - Present Unknown        Elevated troponin ICD-10-CM: R77.8  ICD-9-CM: 790.6  5/5/2022 - Present Unknown        JENNIFER (acute kidney injury) ([facility] ICD-10-CM: N17.9  ICD-9-CM: 584.9  5/5/2022 - Present Unknown        Lab test positive for detection of COVID-19 virus ICD-10-CM: U07.1  ICD-9-CM: 079.89  5/5/2022 - Present Unknown        Acute metabolic encephalopathy XMU-27-AX: G93.41  ICD-9-CM: 348.31  5/5/2022 - Present Unknown        Chronic atrial fibrillation ([facility] ICD-10-CM: I48.20  ICD-9-CM: 427.31  11/19/2020 - Present Yes    Overview Signed 11/19/2020  2:42 PM by EZRA Spencer     Followed by Dr. Jordan Signs. Not tx'd w/ meds; stable. 11/2020.              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 cataract    HX CYSTOCELE REPAIR  2001    cystocele/ rectocele    HX HEART CATHETERIZATION      CATH 9/15/17    HX HYSTERECTOMY  1973    hysterectomy    HX RECTOCELE REPAIR  04/15/2019    Rectopexy      Allergies   Allergen Reactions    Lipitor [Atorvastatin] Other (comments)     Muscle aches    Penicillin G Rash      Social History     Tobacco Use    Smoking status: Never Smoker    Smokeless tobacco: Never Used   Substance Use Topics    Alcohol use: No      Family History   Problem Relation Age of Onset    Cancer Mother         breast    Cancer Father         lung    Heart Disease Sister         CHF      Family history reviewed and negative except as noted above.     Immunization History   Administered Date(s) Administered    COVID-19, Fremanuel Precious, Primary or Immunocompromised Series, MRNA, PF, 100mcg/0.5mL 04/09/2021, 05/07/2021    Influenza High Dose Vaccine PF 10/27/2016, 10/11/2017, 10/19/2018, 10/09/2019, 10/14/2020, 10/21/2021    Influenza Vaccine 10/04/2018    Pneumococcal Conjugate (PCV-13) 10/27/2016    Pneumococcal Polysaccharide (PPSV-23) 12/04/2018    Tdap 10/12/2017    Zoster Recombinant 08/06/2019, 11/16/2019     Prior to Admit Medications:  Current Outpatient Medications   Medication Instructions    Calcium-Cholecalciferol, D3, (CALCIUM 600 WITH VITAMIN D3) 600 mg(1,500mg) -400 unit cap 1 Capsule, Oral, DAILY    cholecalciferol (VITAMIN D3) 2,000 Units, Oral, DAILY    FORMULA MULTIVIT & MINERAL PO Oral, DAILY    GARLIC PO Oral    levothyroxine (SYNTHROID) 88 mcg, Oral, DAILY, Generic OK, \"NAME ALERT\"    lisinopril-hydroCHLOROthiazide (PRINZIDE, ZESTORETIC) 20-12.5 mg per tablet 1 Tablet, Oral, DAILY    nitroglycerin (NITROSTAT) 0.4 mg, SubLINGual, EVERY 5 MIN AS NEEDED    TylenoL 650 mg, EVERY 4 HOURS AS NEEDED       Objective:     Patient Vitals for the past 24 hrs:   Temp Pulse Resp BP SpO2   05/05/22 0322 100.1 °F (37.8 °C) 98 25 (!) 143/69 92 %     Oxygen Therapy  O2 Sat (%): 92 % (05/05/22 0322)  Pulse via Oximetry: 97 beats per minute (05/05/22 0322)  O2 Device: None (Room air) (05/05/22 0322)    Estimated body mass index is 27.62 kg/m² as calculated from the following:    Height as of this encounter: 5' 2\" (1.575 m). Weight as of this encounter: 68.5 kg (151 lb). No intake or output data in the 24 hours ending 05/05/22 0809      Physical Exam:  Blood pressure (!) 143/69, pulse 98, temperature 100.1 °F (37.8 °C), resp. rate 25, height 5' 2\" (1.575 m), weight 68.5 kg (151 lb), SpO2 92 %. General:    Well nourished. No overt distress. Appears stated age. Head:  Normocephalic, atraumatic  Eyes:  Sclerae appear normal.  Pupils equally round. ENT:  Nares appear normal, no drainage. Dry oral mucosa  Neck:  No restricted ROM. Trachea midline   CV:   Irreg irreg rhythm, regular rate. No m/r/g. No jugular venous distension. Lungs:   CTAB. No wheezing, rhonchi, or rales. Respirations even, unlabored. RA O2, bedside sats 92-95% at rest in bed. Abdomen: Bowel sounds present. Soft, nontender, nondistended. Extremities: No cyanosis or clubbing.   No edema  Skin:     No rashes and normal coloration. Warm and dry. Neuro:  CN II-XII grossly intact. Sensation intact. Pleasantly confused. Cooperative with exam.  Psych:  Normal mood and affect. I have reviewed ordered lab tests and independently visualized imaging below:    Last 24hr Labs:  Recent Results (from the past 24 hour(s))   PROCALCITONIN    Collection Time: 05/05/22  3:31 AM   Result Value Ref Range    Procalcitonin 3.62 (H) 0.00 - 0.49 ng/mL   CBC WITH AUTOMATED DIFF    Collection Time: 05/05/22  3:32 AM   Result Value Ref Range    WBC 10.7 4.3 - 11.1 K/uL    RBC 3.24 (L) 4.05 - 5.2 M/uL    HGB 10.0 (L) 11.7 - 15.4 g/dL    HCT 30.9 (L) 35.8 - 46.3 %    MCV 95.4 79.6 - 97.8 FL    MCH 30.9 26.1 - 32.9 PG    MCHC 32.4 31.4 - 35.0 g/dL    RDW 13.3 11.9 - 14.6 %    PLATELET 484 552 - 362 K/uL    MPV 8.7 (L) 9.4 - 12.3 FL    ABSOLUTE NRBC 0.00 0.0 - 0.2 K/uL    DF AUTOMATED      NEUTROPHILS 88 (H) 43 - 78 %    LYMPHOCYTES 2 (L) 13 - 44 %    MONOCYTES 8 4.0 - 12.0 %    EOSINOPHILS 0 (L) 0.5 - 7.8 %    BASOPHILS 0 0.0 - 2.0 %    IMMATURE GRANULOCYTES 1 0.0 - 5.0 %    ABS. NEUTROPHILS 9.5 (H) 1.7 - 8.2 K/UL    ABS. LYMPHOCYTES 0.2 (L) 0.5 - 4.6 K/UL    ABS. MONOCYTES 0.9 0.1 - 1.3 K/UL    ABS. EOSINOPHILS 0.0 0.0 - 0.8 K/UL    ABS. BASOPHILS 0.0 0.0 - 0.2 K/UL    ABS. IMM. GRANS. 0.1 0.0 - 0.5 K/UL   METABOLIC PANEL, COMPREHENSIVE    Collection Time: 05/05/22  3:32 AM   Result Value Ref Range    Sodium 134 (L) 136 - 145 mmol/L    Potassium 4.1 3.5 - 5.1 mmol/L    Chloride 103 98 - 107 mmol/L    CO2 25 21 - 32 mmol/L    Anion gap 6 (L) 7 - 16 mmol/L    Glucose 175 (H) 65 - 100 mg/dL    BUN 40 (H) 8 - 23 MG/DL    Creatinine 1.70 (H) 0.6 - 1.0 MG/DL    GFR est AA 37 (L) >60 ml/min/1.73m2    GFR est non-AA 30 (L) >60 ml/min/1.73m2    Calcium 8.5 8.3 - 10.4 MG/DL    Bilirubin, total 0.5 0.2 - 1.1 MG/DL    ALT (SGPT) 16 12 - 65 U/L    AST (SGOT) 22 15 - 37 U/L    Alk.  phosphatase 79 50 - 136 U/L    Protein, total 7.4 6.3 - 8.2 g/dL    Albumin 3.3 3.2 - 4.6 g/dL    Globulin 4.1 (H) 2.3 - 3.5 g/dL    A-G Ratio 0.8 (L) 1.2 - 3.5     MAGNESIUM    Collection Time: 05/05/22  3:32 AM   Result Value Ref Range    Magnesium 2.2 1.8 - 2.4 mg/dL   TROPONIN-HIGH SENSITIVITY    Collection Time: 05/05/22  3:32 AM   Result Value Ref Range    Troponin-High Sensitivity 1,521.4 (HH) 0 - 14 pg/mL   LACTIC ACID    Collection Time: 05/05/22  4:19 AM   Result Value Ref Range    Lactic acid 1.4 0.4 - 2.0 MMOL/L   COVID-19 RAPID TEST    Collection Time: 05/05/22  4:19 AM   Result Value Ref Range    Specimen source NASAL      COVID-19 rapid test Detected (AA) NOTD     TROPONIN-HIGH SENSITIVITY    Collection Time: 05/05/22  4:59 AM   Result Value Ref Range    Troponin-High Sensitivity 1,640.5 (HH) 0 - 14 pg/mL   URINALYSIS W/ RFLX MICROSCOPIC    Collection Time: 05/05/22  4:59 AM   Result Value Ref Range    Color YELLOW      Appearance CLOUDY      Specific gravity 1.020 1.001 - 1.023      pH (UA) 6.0 5.0 - 9.0      Protein 30 (A) NEG mg/dL    Glucose Negative mg/dL    Ketone Negative NEG mg/dL    Bilirubin Negative NEG      Blood MODERATE (A) NEG      Urobilinogen 0.2 0.2 - 1.0 EU/dL    Nitrites Positive (A) NEG      Leukocyte Esterase LARGE (A) NEG     URINE MICROSCOPIC    Collection Time: 05/05/22  4:59 AM   Result Value Ref Range    WBC >100 0 /hpf    RBC 0 0 /hpf    Epithelial cells 0-3 0 /hpf    Bacteria 4+ (H) 0 /hpf    Casts 0 0 /lpf    Crystals, urine 0 0 /LPF    Mucus 0 0 /lpf   C REACTIVE PROTEIN, QT    Collection Time: 05/05/22  4:59 AM   Result Value Ref Range    C-Reactive protein 13.7 (H) 0.0 - 0.9 mg/dL       All Micro Results     Procedure Component Value Units Date/Time    CULTURE, URINE [[id number]]     Order Status: Sent Specimen: Clean catch     CULTURE, URINE [[id number]]     Order Status: Canceled Specimen: Clean catch     COVID-19 RAPID TEST [[id number]]  (Abnormal) Collected: 05/05/22 0419    Order Status: Completed Specimen: Nasopharyngeal Updated: 05/05/22 0020 Specimen source NASAL        COVID-19 rapid test Detected        Comment:      The specimen is POSITIVE for SARS-CoV-2, the novel coronavirus associated with COVID-19. This test has been authorized by the FDA under an Emergency Use Authorization (EUA) for use by authorized laboratories. Fact sheet for Healthcare Providers: [employer]  Fact sheet for Patients: [employer]       Methodology: Isothermal Nucleic Acid Amplification         CULTURE, BLOOD [[id number]] Collected: 05/05/22 0419    Order Status: Completed Specimen: Blood Updated: 05/05/22 0509    CULTURE, BLOOD [[id number]]     Order Status: Sent Specimen: Blood           Other Studies:  XR CHEST PORT    Result Date: 5/5/2022  Portable chest xray  COMPARISON: June 2018 INDICATION: Shortness of breath FINDINGS: There are bilateral interstitial densities. Persistent elevation of the left hemidiaphragm. No pneumothorax or large pleural effusion. Heart is enlarged. Mediastinal contour is within normal limits. 1. Bilateral interstitial densities, suspicious for mild pulmonary edema. 2. Stable cardiomegaly. CT CHEST PULMONARY EMBOLISM    Result Date: 5/5/2022  Clinical history: PE protocol. Shortness of breath TECHNIQUE: Axial, coronal and sagittal CT of the chest with IV contrast. CT dose reduction was achieved through use of a standardized protocol tailored for this examination and automatic exposure control for dose modulation. COMPARISON: Chest x-ray earlier today. FINDINGS: There is no evidence of pulmonary embolism. The heart is enlarged. There is no pericardial effusion. There is coronary artery calcification. There is mitral annular calcification. The thoracic aorta is normal in course and caliber with atherosclerotic calcification. There is no lymphadenopathy. There is persistent elevation of the left hemidiaphragm.  Patchy ill-defined densities in the left lung base, consistent with atelectasis. There is no focal pulmonary consolidation. No pneumothorax, pulmonary edema or pleural effusion. There is no endobronchial lesion. Included upper abdomen demonstrates micronodular contour of the liver. Prominence of the renal collecting system, appearing chronic. Bones are osteopenic. There are degenerative changes of the spine and the glenohumeral joints. 1. Negative for pulmonary embolism. 2. Small left lung base atelectasis. Negative for pneumonia, pulmonary edema or pleural effusion. 3. Cardiomegaly and coronary artery calcification. Medications Administered     0.9% sodium chloride infusion 1,000 mL     Admin Date  05/05/2022 Action  New Bag Dose  1,000 mL Rate  2,000 mL/hr Route  IntraVENous Administered By  Stephenie Stein RN          cefTRIAXone (ROCEPHIN) 1 g in 0.9% sodium chloride (MBP/ADV) 50 mL MBP     Admin Date  05/05/2022 Action  New Bag Dose  1 g Rate  100 mL/hr Route  IntraVENous Administered By  Stephenie Stein RN          iopamidoL (ISOVUE-370) 76 % injection 100 mL     Admin Date  05/05/2022 Action  Given Dose  100 mL Route  IntraVENous Administered By  Althia Sandifer          saline peripheral flush soln 10 mL     Admin Date  05/05/2022 Action  Given Dose  10 mL Route  InterCATHeter Administered By  Althia Sandifer          sodium chloride 0.9 % bolus infusion 1,000 mL     Admin Date  05/05/2022 Action  New Bag Dose  1,000 mL Route  IntraVENous Administered By  Stephenie Stein RN          sodium chloride 0.9 % bolus infusion 100 mL     Admin Date  05/05/2022 Action  New Bag Dose  100 mL Route  IntraVENous Administered By  Althia Sandifer                Signed:  Jevon Patel MD    Part of this note may have been written by using a voice dictation software. The note has been proof read but may still contain some grammatical/other typographical errors.

## 2022-05-06 VITALS
TEMPERATURE: 100.2 F | RESPIRATION RATE: 18 BRPM | DIASTOLIC BLOOD PRESSURE: 76 MMHG | BODY MASS INDEX: 27.79 KG/M2 | WEIGHT: 151 LBS | HEART RATE: 99 BPM | OXYGEN SATURATION: 92 % | SYSTOLIC BLOOD PRESSURE: 133 MMHG | HEIGHT: 62 IN

## 2022-05-06 PROBLEM — N17.0 ACUTE RENAL FAILURE WITH ACUTE TUBULAR NECROSIS SUPERIMPOSED ON STAGE 4 CHRONIC KIDNEY DISEASE (HCC): Status: ACTIVE | Noted: 2022-05-05

## 2022-05-06 PROBLEM — F02.80 LATE ONSET ALZHEIMER'S DEMENTIA WITHOUT BEHAVIORAL DISTURBANCE (HCC): Status: ACTIVE | Noted: 2019-01-29

## 2022-05-06 PROBLEM — G30.1 LATE ONSET ALZHEIMER'S DEMENTIA WITHOUT BEHAVIORAL DISTURBANCE (HCC): Status: ACTIVE | Noted: 2019-01-29

## 2022-05-06 PROBLEM — J96.01 ACUTE HYPOXEMIC RESPIRATORY FAILURE DUE TO COVID-19 (HCC): Status: ACTIVE | Noted: 2022-05-05

## 2022-05-06 PROBLEM — N18.4 ACUTE RENAL FAILURE WITH ACUTE TUBULAR NECROSIS SUPERIMPOSED ON STAGE 4 CHRONIC KIDNEY DISEASE (HCC): Status: ACTIVE | Noted: 2022-01-01

## 2022-05-06 PROBLEM — F02.80 LATE ONSET ALZHEIMER'S DEMENTIA WITHOUT BEHAVIORAL DISTURBANCE (HCC): Chronic | Status: ACTIVE | Noted: 2019-01-29

## 2022-05-06 PROBLEM — N18.4 ACUTE RENAL FAILURE WITH ACUTE TUBULAR NECROSIS SUPERIMPOSED ON STAGE 4 CHRONIC KIDNEY DISEASE (HCC): Status: ACTIVE | Noted: 2022-05-05

## 2022-05-06 PROBLEM — I21.A1 TYPE 2 MI (MYOCARDIAL INFARCTION) (HCC): Status: ACTIVE | Noted: 2022-01-01

## 2022-05-06 PROBLEM — I21.A1 TYPE 2 MI (MYOCARDIAL INFARCTION) (HCC): Status: ACTIVE | Noted: 2022-05-05

## 2022-05-06 PROBLEM — G30.1 LATE ONSET ALZHEIMER'S DEMENTIA WITHOUT BEHAVIORAL DISTURBANCE (HCC): Chronic | Status: ACTIVE | Noted: 2019-01-29

## 2022-05-06 PROBLEM — U07.1 ACUTE HYPOXEMIC RESPIRATORY FAILURE DUE TO COVID-19 (HCC): Status: ACTIVE | Noted: 2022-05-05

## 2022-05-06 LAB
ANION GAP SERPL CALC-SCNC: 5 MMOL/L (ref 7–16)
BUN SERPL-MCNC: 30 MG/DL (ref 8–23)
CALCIUM SERPL-MCNC: 8.3 MG/DL (ref 8.3–10.4)
CHLORIDE SERPL-SCNC: 109 MMOL/L (ref 98–107)
CO2 SERPL-SCNC: 24 MMOL/L (ref 21–32)
CREAT SERPL-MCNC: 1.3 MG/DL (ref 0.6–1)
GLUCOSE SERPL-MCNC: 149 MG/DL (ref 65–100)
MM INDURATION POC: 0 MM (ref 0–5)
POTASSIUM SERPL-SCNC: 4.2 MMOL/L (ref 3.5–5.1)
PPD POC: NEGATIVE NEGATIVE
SODIUM SERPL-SCNC: 138 MMOL/L (ref 136–145)
TROPONIN-HIGH SENSITIVITY: 1880.3 PG/ML (ref 0–14)

## 2022-05-06 PROCEDURE — 74011250637 HC RX REV CODE- 250/637: Performed by: EMERGENCY MEDICINE

## 2022-05-06 PROCEDURE — 99232 SBSQ HOSP IP/OBS MODERATE 35: CPT | Performed by: INTERNAL MEDICINE

## 2022-05-06 PROCEDURE — 74011250636 HC RX REV CODE- 250/636: Performed by: INTERNAL MEDICINE

## 2022-05-06 PROCEDURE — 74011250637 HC RX REV CODE- 250/637: Performed by: NURSE PRACTITIONER

## 2022-05-06 PROCEDURE — 74011000258 HC RX REV CODE- 258: Performed by: INTERNAL MEDICINE

## 2022-05-06 PROCEDURE — 74011000250 HC RX REV CODE- 250: Performed by: INTERNAL MEDICINE

## 2022-05-06 PROCEDURE — 74011250637 HC RX REV CODE- 250/637: Performed by: INTERNAL MEDICINE

## 2022-05-06 RX ORDER — GUAIFENESIN/DEXTROMETHORPHAN 100-10MG/5
5 SYRUP ORAL
Qty: 200 ML | Refills: 0 | Status: SHIPPED | OUTPATIENT
Start: 2022-05-06 | End: 2022-05-16

## 2022-05-06 RX ORDER — ONDANSETRON 8 MG/1
8 TABLET, ORALLY DISINTEGRATING ORAL
Qty: 20 TABLET | Refills: 0 | Status: SHIPPED | OUTPATIENT
Start: 2022-05-06

## 2022-05-06 RX ORDER — METOPROLOL SUCCINATE 25 MG/1
25 TABLET, EXTENDED RELEASE ORAL DAILY
Qty: 30 TABLET | Refills: 0 | Status: SHIPPED | OUTPATIENT
Start: 2022-05-07

## 2022-05-06 RX ORDER — ALBUTEROL SULFATE 90 UG/1
1 AEROSOL, METERED RESPIRATORY (INHALATION)
Qty: 18 G | Refills: 0 | Status: SHIPPED | OUTPATIENT
Start: 2022-05-06

## 2022-05-06 RX ORDER — ISOSORBIDE MONONITRATE 30 MG/1
30 TABLET, EXTENDED RELEASE ORAL DAILY
Qty: 30 TABLET | Refills: 0 | Status: SHIPPED | OUTPATIENT
Start: 2022-05-07

## 2022-05-06 RX ORDER — LEVOFLOXACIN 750 MG/1
750 TABLET ORAL EVERY OTHER DAY
Qty: 3 TABLET | Refills: 0 | Status: SHIPPED | OUTPATIENT
Start: 2022-05-06 | End: 2022-05-11

## 2022-05-06 RX ORDER — DEXAMETHASONE 6 MG/1
6 TABLET ORAL
Qty: 10 TABLET | Refills: 0 | Status: SHIPPED | OUTPATIENT
Start: 2022-05-06 | End: 2022-05-16

## 2022-05-06 RX ORDER — FAMOTIDINE 20 MG/1
10 TABLET, FILM COATED ORAL 2 TIMES DAILY
Qty: 30 TABLET | Refills: 0 | Status: SHIPPED | OUTPATIENT
Start: 2022-05-06

## 2022-05-06 RX ADMIN — SODIUM CHLORIDE, PRESERVATIVE FREE 10 ML: 5 INJECTION INTRAVENOUS at 05:28

## 2022-05-06 RX ADMIN — SODIUM CHLORIDE 75 ML/HR: 9 INJECTION, SOLUTION INTRAVENOUS at 05:11

## 2022-05-06 RX ADMIN — GUAIFENESIN SYRUP AND DEXTROMETHORPHAN 5 ML: 100; 10 SYRUP ORAL at 09:06

## 2022-05-06 RX ADMIN — ISOSORBIDE MONONITRATE 30 MG: 30 TABLET, EXTENDED RELEASE ORAL at 08:55

## 2022-05-06 RX ADMIN — SODIUM CHLORIDE, PRESERVATIVE FREE 10 ML: 5 INJECTION INTRAVENOUS at 05:29

## 2022-05-06 RX ADMIN — HEPARIN SODIUM 5000 UNITS: 5000 INJECTION INTRAVENOUS; SUBCUTANEOUS at 05:28

## 2022-05-06 RX ADMIN — METOPROLOL SUCCINATE 25 MG: 25 TABLET, EXTENDED RELEASE ORAL at 08:57

## 2022-05-06 RX ADMIN — LEVOTHYROXINE SODIUM 88 MCG: 0.09 TABLET ORAL at 08:57

## 2022-05-06 RX ADMIN — CEFTRIAXONE 1 G: 1 INJECTION, POWDER, FOR SOLUTION INTRAMUSCULAR; INTRAVENOUS at 08:55

## 2022-05-06 NOTE — ACP (ADVANCE CARE PLANNING)
[facility] Hospitalist Service  At the heart of better care     Advance Care Planning   Admit Date:  2022  3:11 AM   Name:  Huan Mchugh   Age:  80 y.o. Sex:  female  :  1935   MRN:  [id number]   Room:  200/65    Huan Mchugh is able to make her own decisions: No    If pt unable to make decisions, POA/surrogate decision maker:  Daughter    Patient / surrogate decision-maker directed:  Code Status: DNR/DNI, hospice, comfort focus care to maximize patient's quality of life      Patient or surrogate consented to discussion of the current conditions, workup, management plans, prognosis, and understand the risk for further deterioration. Time spent: 15 minutes in direct discussion (face to face and/or over phone).     Signed:  Landy Rice DO

## 2022-05-06 NOTE — PROGRESS NOTES
Pt resting in bed awake. Daughter at bedside. Pt alert to person and place at this time. 0.5L NC in place. No s/sx of distress noted. Denies any pain. NS infusing at 75ml/hr. Encouraged to call for assistance as needed. Call light within reach. Will continue to monitor.

## 2022-05-06 NOTE — PROGRESS NOTES
05/06/22 1150   Resting (Room Air)   SpO2 90   HR 94   Resting (On O2)   SpO2 93   HR 97   O2 Device Nasal cannula   O2 Flow Rate (l/min) 1 l/min   During Walk (Room Air)   SpO2 88      Walk/Assistance Device Ambulation   Rate of Dyspnea 0   During Walk (On O2)   SpO2 93      O2 Device Nasal cannula   O2 Flow Rate (l/min) 1 l/min   Need Additional O2 Flow Rate Rows No   Walk/Assistance Device Ambulation   Rate of Dyspnea 0   After Walk   SpO2 93      O2 Device Nasal cannula   O2 Flow Rate (l/min) 1 l/min   Does the Patient Qualify for Home O2 Yes   Does the Patient Need Portable Oxygen Tanks Yes

## 2022-05-06 NOTE — PROGRESS NOTES
Abdi Vásquez MD   Physician   Cardiology   H&P      Addendum   Date of Service:  05/05/22 1180                 Expand All Collapse All          []Hide copied text    []Ortizver for details    [facility] Initial Cardiac Evaluation                                                              Date of  Admission: 5/5/2022  3:11 AM               Primary Care Physician:  [facility]   Primary Cardiologist: Dr. Jordan Signs   Referring Physician: Hospitalist   Supervising Cardiologist: Dr. Jordan Signs      Reason for cardiac evaluation: elevated trop         Grecia Aragon is a 80 y.o. female with prior h/o permanent A. Fib (no AC in past d/t rectal prolapse/bleeding), severe MS, HTN, dementia, CAD s/p LHC/RHC in 2017 showed severe multivessel CAD and severe MS (saw CTS and decision made to medically manage in 2017). Patient presented [facility] ED at [facility] with c/o SOB. Her dgt reported URI sx started Sunday then patient developed poor po intake and weakness. Her daughter pushed PO fluids and says the patient perked up. However, over the next few days she had ongoing coughing fits a/w SOB and some confusion more so than is usual for her. She had a fever around 101F earlier this morning. Daughter was likely exposed to COVID-19 by a co-worker this past Friday and that is possibly how her mom got it. She is vaccinated x2 but overdue for her booster. In ED, O2 sat 92-95% on RA. EKG showed AFib.  Labs showed sCr 1.7, procalcitonin 3.62, LA 1.4 and troponin 1,521 and 1,640 on repeat. Her rapid COVID swab is positive. UA with pyuria. CXR showed stable cardiomegaly and bilateral interstitial densities concerning for mild pulmonary edema. CT chest was negative for PE. Hospitalist admitted patient with Margurette Ledger, elevated trop, cystitis, + covid and AMS. Cardiology was consulted for elevated trop. Dgt at bedside and doesn't want any aggressive measures.      Troponins have certainly trended up and appear to possibly have peaked.   Patient has known underlying  multivessel CAD significant mitral valve pathology. She has not been a candidate for any invasive work-up or intervention and in fact does not desire this and wishes for conservative therapy. In light of her current diagnosis of COVID I have opted to defer an echo on this hospitalization and that at this juncture it is really not going to alter current management decisions. Continue appropriate treatment for her COVID-19. Troponins are likely a type II leak and need no aggressive or different treatment and she is currently on. Please call if there is any decompensation in her cardiac status          Patient Active Problem List   Diagnosis Code    Hypothyroidism, adult E03.9    Osteoarthrosis, unspecified whether generalized or localized, shoulder region M19.019    Prediabetes R73.03    Dyslipidemia E78.5    Osteopenia with high risk of fracture M85.80    Benign hypertension with CKD (chronic kidney disease) stage IV (HCC) I12.9, C78.7    Systolic murmur F02.4    LAE (left atrial enlargement)-Severe I51.7    Mitral valve annular calcification I05.9    Weakness of both lower extremities R29.898    Hearing loss of right ear H91.91    Dementia without behavioral disturbance (HCC) F03.90    Numbness and tingling of both legs R20.0, R20.2    Chronic atrial fibrillation (HCC) I48.20    Unstable gait R26.81    Acute cystitis N30.00    Elevated troponin R77.8    JENNIFER (acute kidney injury) ([facility] N17.9    Lab test positive for detection of COVID-19 virus U07.1    Acute metabolic encephalopathy Z84.99    Chest pain R07.9              Past Medical History:   Diagnosis Date    Dementia without behavioral disturbance ([facility] 1/29/2019    Hypothyroidism, adult       hypothyroid    Severe mitral stenosis by prior echocardiogram      Unstable gait 7/8/2021            Past Surgical History:   Procedure Laterality Date    HX CATARACT REMOVAL         yolanda.  cataract    HX CYSTOCELE REPAIR   2001     cystocele/ rectocele    HX HEART CATHETERIZATION         CATH 9/15/17    HX HYSTERECTOMY   1973     hysterectomy    HX RECTOCELE REPAIR   04/15/2019     Rectopexy            Allergies   Allergen Reactions    Lipitor [Atorvastatin] Other (comments)       Muscle aches    Penicillin G Rash            Family History   Problem Relation Age of Onset    Cancer Mother           breast    Cancer Father           lung    Heart Disease Sister           CHF      Social History   Social History            Socioeconomic History    Marital status:        Spouse name: Not on file    Number of children: Not on file    Years of education: Not on file    Highest education level: Not on file   Occupational History    Not on file   Tobacco Use    Smoking status: Never Smoker    Smokeless tobacco: Never Used   Vaping Use    Vaping Use: Never used   Substance and Sexual Activity    Alcohol use: No    Drug use: No    Sexual activity: Never       Birth control/protection: Surgical   Other Topics Concern    Not on file   Social History Narrative    Not on file      Social Determinants of Health          Financial Resource Strain:     Difficulty of Paying Living Expenses: Not on file   Food Insecurity:     Worried About Running Out of Food in the Last Year: Not on file    Deysi of Food in the Last Year: Not on file   Transportation Needs:     Lack of Transportation (Medical): Not on file    Lack of Transportation (Non-Medical):  Not on file   Physical Activity:     Days of Exercise per Week: Not on file    Minutes of Exercise per Session: Not on file   Stress:     Feeling of Stress : Not on file   Social Connections:     Frequency of Communication with Friends and Family: Not on file    Frequency of Social Gatherings with Friends and Family: Not on file    Attends Buddhism Services: Not on file    Active Member of Clubs or Organizations: Not on file    Attends Club or Organization Meetings: Not on file    Marital Status: Not on file   Intimate Partner Violence:     Fear of Current or Ex-Partner: Not on file    Emotionally Abused: Not on file    Physically Abused: Not on file    Sexually Abused: Not on file   Housing Stability:     Unable to Pay for Housing in the Last Year: Not on file    Number of Places Lived in the Last Year: Not on file    Unstable Housing in the Last Year: Not on file                   Current Facility-Administered Medications   Medication Dose Route Frequency    sodium chloride (NS) flush 5-10 mL  5-10 mL IntraVENous Q8H    sodium chloride (NS) flush 5-10 mL  5-10 mL IntraVENous PRN    levothyroxine (SYNTHROID) tablet 88 mcg  88 mcg Oral DAILY    sodium chloride (NS) flush 5-40 mL  5-40 mL IntraVENous Q8H    sodium chloride (NS) flush 5-40 mL  5-40 mL IntraVENous PRN    acetaminophen (TYLENOL) tablet 650 mg  650 mg Oral Q6H PRN     Or    acetaminophen (TYLENOL) suppository 650 mg  650 mg Rectal Q6H PRN    polyethylene glycol (MIRALAX) packet 17 g  17 g Oral DAILY PRN    ondansetron (ZOFRAN ODT) tablet 4 mg  4 mg Oral Q8H PRN     Or    ondansetron (ZOFRAN) injection 4 mg  4 mg IntraVENous Q6H PRN    heparin (porcine) injection 5,000 Units  5,000 Units SubCUTAneous Q8H    [START ON 5/6/2022] cefTRIAXone (ROCEPHIN) 1 g in 0.9% sodium chloride (MBP/ADV) 50 mL MBP  1 g IntraVENous Q24H    tuberculin injection 5 Units  5 Units IntraDERMal ONCE    0.9% sodium chloride infusion  75 mL/hr IntraVENous CONTINUOUS           Current Outpatient Medications   Medication Sig    levothyroxine (SYNTHROID) 88 mcg tablet Take 1 Tablet by mouth daily. Generic OK, \"NAME ALERT\"  Indications: a condition with low thyroid hormone levels    nitroglycerin (NITROSTAT) 0.4 mg SL tablet 1 Tablet by SubLINGual route every five (5) minutes as needed for Chest Pain.  lisinopril-hydroCHLOROthiazide (PRINZIDE, ZESTORETIC) 20-12.5 mg per tablet Take 1 Tablet by mouth daily.     GARLIC PO Take  by mouth.  cholecalciferol (VITAMIN D3) (2,000 UNITS /50 MCG) cap capsule Take 2,000 Units by mouth daily.  Calcium-Cholecalciferol, D3, (CALCIUM 600 WITH VITAMIN D3) 600 mg(1,500mg) -400 unit cap Take 1 Cap by mouth daily.  TYLENOL 325 mg Tab take 650 mg by mouth every four (4) hours as needed for Pain.  FORMULA MULTIVIT & MINERAL PO Take  by mouth daily.      Limited d/t dementia  Review of Systems   Cardiovascular: Positive for chest pain (per daughter ). Psychiatric/Behavioral: Positive for altered mental status (with underlying dementia ).        Physical Exam      Vitals:     05/05/22 0322   BP: (!) 143/69   Pulse: 98   Resp: 25   Temp: 100.1 °F (37.8 °C)   SpO2: 92%   Weight: 68.5 kg (151 lb)   Height: 5' 2\" (1.575 m)         Physical Exam:  General: Well Developed, pleasantly confused   HEENT: pupils equal and round, no abnormalities noted  Neck: supple, no JVD, no carotid bruits  Heart: S1S2 irregular with + murmur   Lungs: faint exp wheeze   Abd: soft, nontender, nondistended, with good bowel sounds  Ext: warm, no edema, calves supple/nontender, pulses 2+ bilaterally  Skin: warm and dry  Psychiatric: Normal mood and affect  Neurologic: pleasantly confused with dementia      Cardiographics     Telemetry: A.fib rate controlled   ECG: A.  Fib rate controlled   Echocardiogram: ordered by primary     Labs:       Recent Labs     05/05/22  0332   *   K 4.1   MG 2.2   BUN 40*   CREA 1.70*   *   WBC 10.7   HGB 10.0*   HCT 30.9*             Assessment/Plan:      Assessment:      Principal Problem:    JENNIFER (acute kidney injury) ([facility] (5/5/2022)- per primary team likely from poor po intake d/t covid.      Active Problems:    Hypothyroidism, adult (7/21/2015)       Dyslipidemia (12/8/2016)       Benign hypertension with CKD (chronic kidney disease) stage IV (HCC) (1/17/2017)       LAE (left atrial enlargement)-Severe (7/27/2017)       Mitral valve annular calcification (7/27/2017)       Dementia without behavioral disturbance ([facility] (1/29/2019)       Chronic atrial fibrillation ([facility] (11/19/2020)- No ac due rectal bleeding in past       Overview: Followed by Dr. Manuelito Christopher. Not tx'd w/ meds; stable. 11/2020.       Acute cystitis (5/5/2022)       Elevated troponin (5/5/2022)- known CAD; multivessel disease in 2017 with severe MS. Will continue medical treatment as noted back in 2017. Dgt request no aggressive treatment. Will add low dose Imdur and BB for now.         Lab test positive for detection of COVID-19 virus (5/5/2022)       Acute metabolic encephalopathy (2/1/8942)       Chest pain (5/5/2022)- see above.               We appreciate the opportunity to participate in this patient's care.   Lata Nix NP  Supervising MD: Dr. Adelaida Quarles      5/5/2022     10:46 AM     I have personally seen and examined Aravind Garcia . am  5/5/2022 10:46 AM     Admit Date: 5/5/2022     Admit Diagnosis: JENNIFER (acute kidney injury) ([facility] [N17.9]  Elevated troponin [R77.8]  Acute cystitis [N30.00]        Subjective:    Patient admitted with COVID and respiratory issues has longstanding history of coronary artery disease and mitral valve disease.   She is not a candidate for any intervention or any invasive work-up.     Objective:      Visit Vitals  BP (!) 143/69 (BP 1 Location: Right upper arm, BP Patient Position: At rest)   Pulse 98   Temp 100.1 °F (37.8 °C)   Resp 25   Ht 5' 2\" (1.575 m)   Wt 151 lb (68.5 kg)   SpO2 92%   BMI 27.62 kg/m²         ROS:  reviewed and verified  Physical Exam:     Physical Examination: General appearance - alert, well appearing, and in no distress  Chest/CV - decreased air entry noted    Heart - irregularly irregular rhythm  Abdomen/GI - soft, nontender, nondistended, no masses or organomegaly  Extremities - peripheral pulses normal, no pedal edema, no clubbing or cyanosis               Current Facility-Administered Medications Medication Dose Route Frequency    sodium chloride (NS) flush 5-10 mL  5-10 mL IntraVENous Q8H    sodium chloride (NS) flush 5-10 mL  5-10 mL IntraVENous PRN    levothyroxine (SYNTHROID) tablet 88 mcg  88 mcg Oral DAILY    sodium chloride (NS) flush 5-40 mL  5-40 mL IntraVENous Q8H    sodium chloride (NS) flush 5-40 mL  5-40 mL IntraVENous PRN    acetaminophen (TYLENOL) tablet 650 mg  650 mg Oral Q6H PRN     Or    acetaminophen (TYLENOL) suppository 650 mg  650 mg Rectal Q6H PRN    polyethylene glycol (MIRALAX) packet 17 g  17 g Oral DAILY PRN    ondansetron (ZOFRAN ODT) tablet 4 mg  4 mg Oral Q8H PRN     Or    ondansetron (ZOFRAN) injection 4 mg  4 mg IntraVENous Q6H PRN    heparin (porcine) injection 5,000 Units  5,000 Units SubCUTAneous Q8H    [START ON 5/6/2022] cefTRIAXone (ROCEPHIN) 1 g in 0.9% sodium chloride (MBP/ADV) 50 mL MBP  1 g IntraVENous Q24H    tuberculin injection 5 Units  5 Units IntraDERMal ONCE    0.9% sodium chloride infusion  75 mL/hr IntraVENous CONTINUOUS    isosorbide mononitrate ER (IMDUR) tablet 30 mg  30 mg Oral DAILY    metoprolol succinate (TOPROL-XL) XL tablet 25 mg  25 mg Oral DAILY           Current Outpatient Medications   Medication Sig    levothyroxine (SYNTHROID) 88 mcg tablet Take 1 Tablet by mouth daily. Generic OK, \"NAME ALERT\"  Indications: a condition with low thyroid hormone levels    nitroglycerin (NITROSTAT) 0.4 mg SL tablet 1 Tablet by SubLINGual route every five (5) minutes as needed for Chest Pain.  lisinopril-hydroCHLOROthiazide (PRINZIDE, ZESTORETIC) 20-12.5 mg per tablet Take 1 Tablet by mouth daily.  GARLIC PO Take  by mouth.  cholecalciferol (VITAMIN D3) (2,000 UNITS /50 MCG) cap capsule Take 2,000 Units by mouth daily.  Calcium-Cholecalciferol, D3, (CALCIUM 600 WITH VITAMIN D3) 600 mg(1,500mg) -400 unit cap Take 1 Cap by mouth daily.  TYLENOL 325 mg Tab take 650 mg by mouth every four (4) hours as needed for Pain.      FORMULA MULTIVIT & MINERAL PO Take  by mouth daily.         Data Review:   @LABRCNT(Na,K,BUN,CREA,WBC,HGB,HCT,PLT,INR,TRP,TCHOL*,Triglyceride*,LDL*,LDLCPOC HDL*,HDL])@     TELEMETRY:       Assessment/Plan:      Principal Problem:    JENNIFER (acute kidney injury) ([facility] (5/5/2022)     Active Problems:    Hypothyroidism, adult (7/21/2015)       Dyslipidemia (12/8/2016)       Benign hypertension with CKD (chronic kidney disease) stage IV (HCC) (1/17/2017)       LAE (left atrial enlargement)-Severe (7/27/2017)       Mitral valve annular calcification (7/27/2017)       Dementia without behavioral disturbance (HCC) (1/29/2019)       Chronic atrial fibrillation ([facility] (11/19/2020)      Overview: Followed by Dr. Linwood Samson. Not tx'd w/ meds; stable. 11/2020.       Acute cystitis (5/5/2022)       Elevated troponin (5/5/2022)       Lab test positive for detection of COVID-19 virus (5/5/2022)       Acute metabolic encephalopathy (2/8/0145)       Chest pain (5/5/2022)        Admit and supportive care for COVID-pneumonia. There is really no role for any aggressive and certainly no invasive cardiac work-up. Echo was not needed but has been ordered and can be obtained but there will likely be no actionable information obtained from this.     Raiza Sewell MD     I have reviewed this note in its entirety and edited where appropriate.  I have performed an independent exam . All portions of the E/M have been edited and reviewed prior to signing note.  Maylin Yadav MD             Revision History

## 2022-05-06 NOTE — PROGRESS NOTES
Report received from off going nurse. DTR at bedside. No s/s of acute distress on 1LPM via NC. Respirations regular and unlabored. Patient alert and orient to self and dtr. Will continue to monitor. Encouraged to call for needs.

## 2022-05-06 NOTE — PROGRESS NOTES
Patient resting in bed with oxygen at 0.5LPM via NC. No s/s of acute distress. DTR has gone home for a brief time. Bed alarm on and frequent rounding, patient with gripper socks, bed in low position. Will continue to monitor. Preparing to report to oncoming nurse.

## 2022-05-06 NOTE — PROGRESS NOTES
Dr Lorene Russell notified via perfect serve:     Critical Troponin 1880.3 Previously 2348.6 at [address]

## 2022-05-06 NOTE — PROGRESS NOTES
Discharge instructions and paperwork reviewed with daughter. meds reviewed. Voiced understanding. 1L o2 needed for home. Oxygen already set up at home. IV removed. Copy of paperwork given to pt. Pt to be discharged home when dressed and ready.

## 2022-05-06 NOTE — PROGRESS NOTES
Care Management Interventions  PCP Verified by CM: Yes  Transition of Care Consult (CM Consult): Home Hospice  Support Systems: Child(elisabeth)  Confirm Follow Up Transport: Family  The Plan for Transition of Care is Related to the Following Treatment Goals : home with hospice  The Patient and/or Patient Representative was Provided with a Choice of Provider and Agrees with the Discharge Plan?: Yes  Freedom of Choice List was Provided with Basic Dialogue that Supports the Patient's Individualized Plan of Care/Goals, Treatment Preferences and Shares the Quality Data Associated with the Providers?: Yes  [place] Resource Information Provided?: No  Discharge Location  Patient Expects to be Discharged to[de-identified] Home with hospice  Patient is discharging home with [facility]. CM remains available.

## 2022-05-06 NOTE — DISCHARGE INSTRUCTIONS
Patient Education        Atrial Fibrillation: Care Instructions  Your Care Instructions     Atrial fibrillation is an irregular and often fast heartbeat. Treating this condition is important for several reasons. It can cause blood clots, which can travel from your heart to your brain and cause a stroke. If you have a fast heartbeat, you may feel lightheaded, dizzy, and weak. An irregular heartbeat can also increase your risk for heart failure. Atrial fibrillation is often the result of another heart condition, such as high blood pressure or coronary artery disease. Making changes to improve your heart condition will help you stay healthy and active. Follow-up care is a key part of your treatment and safety. Be sure to make and go to all appointments, and call your doctor if you are having problems. It's also a good idea to know your test results and keep a list of the medicines you take. How can you care for yourself at home? Medicines    · Take your medicines exactly as prescribed. Call your doctor if you think you are having a problem with your medicine. You will get more details on the specific medicines your doctor prescribes.     · If your doctor has given you a blood thinner to prevent a stroke, be sure you get instructions about how to take your medicine safely. Blood thinners can cause serious bleeding problems.     · Do not take any vitamins, over-the-counter drugs, or herbal products without talking to your doctor first.   Lifestyle changes    · Do not smoke. Smoking can increase your chance of a stroke and heart attack. If you need help quitting, talk to your doctor about stop-smoking programs and medicines. These can increase your chances of quitting for good.     · Eat a heart-healthy diet.     · Stay at a healthy weight. Lose weight if you need to.     · Limit alcohol to 2 drinks a day for men and 1 drink a day for women. Too much alcohol can cause health problems.     · Avoid colds and flu.  Get a pneumococcal vaccine shot. If you have had one before, ask your doctor whether you need another dose. Get a flu shot every year. If you must be around people with colds or flu, wash your hands often. Activity    · If your doctor recommends it, get more exercise. Walking is a good choice. Bit by bit, increase the amount you walk every day. Try for at least 30 minutes on most days of the week. You also may want to swim, bike, or do other activities. Your doctor may suggest that you join a cardiac rehabilitation program so that you can have help increasing your physical activity safely.     · Start light exercise if your doctor says it is okay. Even a small amount will help you get stronger, have more energy, and manage stress. Walking is an easy way to get exercise. Start out by walking a little more than you did in the hospital. Gradually increase the amount you walk.     · When you exercise, watch for signs that your heart is working too hard. You are pushing too hard if you cannot talk while you are exercising. If you become short of breath or dizzy or have chest pain, sit down and rest immediately.     · Check your pulse regularly. Place two fingers on the artery at the palm side of your wrist, in line with your thumb. If your heartbeat seems uneven or fast, talk to your doctor. When should you call for help? Call 911 anytime you think you may need emergency care. For example, call if:    · You have symptoms of a heart attack. These may include:  ? Chest pain or pressure, or a strange feeling in the chest.  ? Sweating. ? Shortness of breath. ? Nausea or vomiting. ? Pain, pressure, or a strange feeling in the back, neck, jaw, or upper belly or in one or both shoulders or arms. ? Lightheadedness or sudden weakness. ? A fast or irregular heartbeat. After you call 911, the  may tell you to chew 1 adult-strength or 2 to 4 low-dose aspirin. Wait for an ambulance.  Do not try to drive yourself.     · You have symptoms of a stroke. These may include:  ? Sudden numbness, tingling, weakness, or loss of movement in your face, arm, or leg, especially on only one side of your body. ? Sudden vision changes. ? Sudden trouble speaking. ? Sudden confusion or trouble understanding simple statements. ? Sudden problems with walking or balance. ? A sudden, severe headache that is different from past headaches.     · You passed out (lost consciousness). Call your doctor now or seek immediate medical care if:    · You have new or increased shortness of breath.     · You feel dizzy or lightheaded, or you feel like you may faint.     · Your heart rate becomes irregular.     · You can feel your heart flutter in your chest or skip heartbeats. Tell your doctor if these symptoms are new or worse. Watch closely for changes in your health, and be sure to contact your doctor if you have any problems. Where can you learn more? Go to http://www.gray.com/  Enter U020 in the search box to learn more about \"Atrial Fibrillation: Care Instructions. \"  Current as of: January 10, 2022               Content Version: 13.2  © 8206-7841 [employer]. Care instructions adapted under license by [employer] (which disclaims liability or warranty for this information). If you have questions about a medical condition or this instruction, always ask your healthcare professional. [address] any warranty or liability for your use of this information.          DISCHARGE SUMMARY from Nurse    PATIENT INSTRUCTIONS:    After general anesthesia or intravenous sedation, for 24 hours or while taking prescription Narcotics:  · Limit your activities  · Do not drive and operate hazardous machinery  · Do not make important personal or business decisions  · Do  not drink alcoholic beverages  · If you have not urinated within 8 hours after discharge, please contact your surgeon on call.    Report the following to your surgeon:  · Excessive pain, swelling, redness or odor of or around the surgical area  · Temperature over 100.5  · Nausea and vomiting lasting longer than 4 hours or if unable to take medications  · Any signs of decreased circulation or nerve impairment to extremity: change in color, persistent  numbness, tingling, coldness or increase pain  · Any questions    What to do at Home:  Recommended activity: Activity as tolerated,     If you experience any of the following symptoms shortness of breath not relived by rest, pain not relieved by medication, chest pain or pressure, increase in swelling, fever greater than 101, nausea, vomiting, or diarrhea, please follow up with MD.    *  Please give a list of your current medications to your Primary Care Provider. *  Please update this list whenever your medications are discontinued, doses are      changed, or new medications (including over-the-counter products) are added. *  Please carry medication information at all times in case of emergency situations. These are general instructions for a healthy lifestyle:    No smoking/ No tobacco products/ Avoid exposure to second hand smoke  Surgeon General's Warning:  Quitting smoking now greatly reduces serious risk to your health. Obesity, smoking, and sedentary lifestyle greatly increases your risk for illness    A healthy diet, regular physical exercise & weight monitoring are important for maintaining a healthy lifestyle    You may be retaining fluid if you have a history of heart failure or if you experience any of the following symptoms:  Weight gain of 3 pounds or more overnight or 5 pounds in a week, increased swelling in our hands or feet or shortness of breath while lying flat in bed. Please call your doctor as soon as you notice any of these symptoms; do not wait until your next office visit. The discharge information has been reviewed with the patient.   The patient and caregiver verbalized understanding. Discharge medications reviewed with the patient and caregiver and appropriate educational materials and side effects teaching were provided.   ___________________________________________________________________________________________________________________________________

## 2022-05-06 NOTE — DISCHARGE SUMMARY
Hospitalist Discharge Summary   Admit Date:  2022  3:11 AM   DC Note date: 2022  Name:  Grecia Aragon   Age:  80 y.o. Sex:  female  :  1935   MRN:  [id number]   Room:  [facility]  PCP:  EZRA Spencer    Presenting Complaint: Shortness of Breath    Initial Admission Diagnosis: JENNIFER (acute kidney injury) ([facility] [N17.9]  Elevated troponin [R77.8]  Acute cystitis [N30.00]     Problem List for this Hospitalization:  Hospital Problems as of 2022 Date Reviewed: 2022          Codes Class Noted - Resolved POA    Acute hypoxemic respiratory failure due to COVID-19 [facility]) ICD-10-CM: U07.1, J96.01  ICD-9-CM: 518.81, 079.89, 799.02  2022 - Present Unknown        Acute cystitis ICD-10-CM: N30.00  ICD-9-CM: 595.0  2022 - Present Yes        Type 2 MI (myocardial infarction) ([facility] ICD-10-CM: I21. A1  ICD-9-CM: 410.90  2022 - Present Yes        * (Principal) Acute renal failure with acute tubular necrosis superimposed on stage 4 chronic kidney disease (HCC) ICD-10-CM: N17.0, N18.4  ICD-9-CM: 584.5, 585.4  2022 - Present Yes        Acute metabolic encephalopathy JNQ-29-HZ: G93.41  ICD-9-CM: 348.31  2022 - Present Yes        Chest pain ICD-10-CM: R07.9  ICD-9-CM: 786.50  2022 - Present Unknown        Chronic atrial fibrillation ([facility] ICD-10-CM: I48.20  ICD-9-CM: 427.31  2020 - Present Yes    Overview Signed 2020  2:42 PM by EZRA Spencer     Followed by Dr. Jordan Signs. Not tx'd w/ meds; stable. 2020.              Late onset Alzheimer's dementia without behavioral disturbance (HCC) (Chronic) ICD-10-CM: G30.1, F02.80  ICD-9-CM: 331.0, 294.10  2019 - Present Yes        LAE (left atrial enlargement)-Severe ICD-10-CM: I51.7  ICD-9-CM: 429.3  2017 - Present Yes        Mitral valve annular calcification ICD-10-CM: I05.9  ICD-9-CM: 424.0  2017 - Present Yes        Benign hypertension with CKD (chronic kidney disease) stage IV (HCC) ICD-10-CM: I12.9, N18.4  ICD-9-CM: 403.10, 585.4  1/17/2017 - Present Yes        Dyslipidemia ICD-10-CM: E78.5  ICD-9-CM: 272.4  12/8/2016 - Present Yes        Hypothyroidism, adult ICD-10-CM: E03.9  ICD-9-CM: 244.9  7/21/2015 - Present Yes            Did Patient have Sepsis (YES OR NO): yes     Hospital Course: Vani Alfred is a 80 y.o. female with medical history of severe mitral stenosis, atrial fibrillation and HLD who presented to the ER this morning with c/o SOB. She is with her daughter who provides the history. Patient developed URI symptoms this past Sunday. Her oral intake decreased some and she became weak. Her daughter pushed PO fluids and says the patient perked up. However, over the next few days she had ongoing coughing fits a/w SOB and some confusion more so than is usual for her. She had a fever around 101F earlier this morning. Apparently during one of her coughing spells she coughed up a mucous plug and seemed to be breathing better after that. Daughter was likely exposed to COVID-19 by a co-worker this past Friday and that is possibly how her mom got it. She is vaccinated x2 but overdue for her booster. In the ED, T100.2  RR 34 BP 99/56. Initially on RA and oxygen increased 0.5 to 1 L NC. Seen by cardiology and suspected type 2 MI. Daughter is an RN and her care give. Does not want aggressive measures. Her renal function improved with IVF. After discussion with daughter, patient's condition would be best served at home with supplemental oxygen as needed, decadron and as needed medicines comfort symptoms. She qualified for home oxygen 1 L NC. Urine culture not resulted and cannot exclude superimposed bacterial PNA with high procal. S/p rocephin x 2 doses. Will complete 5 day course for possible PNA with levaqin renally dosed as below. Discharged home with hospice. Disposition: Home Health Care Svc  Diet: ADULT DIET Regular  ADULT DIET Regular  Code Status: DNR    Follow Up Orders:   Follow-up Appointments   Procedures    FOLLOW UP VISIT Appointment in: 3 - 5 Days     Standing Status:   Standing     Number of Occurrences:   1     Order Specific Question:   Appointment in     Answer:   3 - 5 Days       Follow-up Information     Follow up With Specialties Details Why Contact Info    Dagoberto Stein, [address] Physician Assistant On 5/13/2022 with Raya Mueller NP   @ 1:20 [address] [facility] [address] [facility]  [phone]            Follow up labs/diagnostics (ultimately defer to outpatient provider):  n/a    Time spent in patient discharge and coordination 39 minutes. Plan was discussed with patient and daughter. All questions answered. Patient was stable at time of discharge. Instructions given to call a physician or return if any concerns. Discharge Info:   Current Discharge Medication List      START taking these medications    Details   metoprolol succinate (TOPROL-XL) 25 mg XL tablet Take 1 Tablet by mouth daily. Qty: 30 Tablet, Refills: 0  Start date: 5/7/2022      isosorbide mononitrate ER (IMDUR) 30 mg tablet Take 1 Tablet by mouth daily. Qty: 30 Tablet, Refills: 0  Start date: 5/7/2022      dexAMETHasone (DECADRON) 6 mg tablet Take 1 Tablet by mouth daily (with breakfast) for 10 days. Qty: 10 Tablet, Refills: 0  Start date: 5/6/2022, End date: 5/16/2022      famotidine (PEPCID) 20 mg tablet Take 0.5 Tablets by mouth two (2) times a day. Indications: treatment to prevent heartburn  Qty: 30 Tablet, Refills: 0  Start date: 5/6/2022      albuterol (PROVENTIL HFA, VENTOLIN HFA, PROAIR HFA) 90 mcg/actuation inhaler Take 1 Puff by inhalation every four (4) hours as needed for Wheezing. Qty: 18 g, Refills: 0  Start date: 5/6/2022      ondansetron (ZOFRAN ODT) 8 mg disintegrating tablet Take 1 Tablet by mouth every eight (8) hours as needed for Nausea or Vomiting.   Qty: 20 Tablet, Refills: 0  Start date: 5/6/2022      levoFLOXacin (LEVAQUIN) 750 mg tablet Take 1 Tablet by mouth every other day for 3 doses. Qty: 3 Tablet, Refills: 0  Start date: 5/6/2022, End date: 5/11/2022      guaiFENesin-dextromethorphan (ROBITUSSIN DM) 100-10 mg/5 mL syrup Take 5 mL by mouth every six (6) hours as needed for Cough or Congestion for up to 10 days. Qty: 200 mL, Refills: 0  Start date: 5/6/2022, End date: 5/16/2022         CONTINUE these medications which have NOT CHANGED    Details   levothyroxine (SYNTHROID) 88 mcg tablet Take 1 Tablet by mouth daily. Generic OK, \"NAME ALERT\"  Indications: a condition with low thyroid hormone levels  Qty: 90 Tablet, Refills: 3    Associated Diagnoses: Hypothyroidism, adult      nitroglycerin (NITROSTAT) 0.4 mg SL tablet 1 Tablet by SubLINGual route every five (5) minutes as needed for Chest Pain. Qty: 30 Tablet, Refills: 3      GARLIC PO Take  by mouth. cholecalciferol (VITAMIN D3) (2,000 UNITS /50 MCG) cap capsule Take 2,000 Units by mouth daily. Calcium-Cholecalciferol, D3, (CALCIUM 600 WITH VITAMIN D3) 600 mg(1,500mg) -400 unit cap Take 1 Cap by mouth daily. TYLENOL 325 mg Tab take 650 mg by mouth every four (4) hours as needed for Pain. FORMULA MULTIVIT & MINERAL PO Take  by mouth daily. STOP taking these medications       lisinopril-hydroCHLOROthiazide (PRINZIDE, ZESTORETIC) 20-12.5 mg per tablet Comments:   Reason for Stopping:               Procedures done this admission:  * No surgery found *    Consults this admission:  IP CONSULT TO CARDIOLOGY    Echocardiogram/EKG results:  No results found for this or any previous visit. EKG Results     Procedure 720 Value Units Date/Time    EKG [[id number]]     Order Status: Completed           Diagnostic Imaging/Tests:   XR CHEST PORT    Result Date: 5/5/2022  Portable chest xray  COMPARISON: June 2018 INDICATION: Shortness of breath FINDINGS: There are bilateral interstitial densities. Persistent elevation of the left hemidiaphragm. No pneumothorax or large pleural effusion.  Heart is enlarged. Mediastinal contour is within normal limits. 1. Bilateral interstitial densities, suspicious for mild pulmonary edema. 2. Stable cardiomegaly. CT CHEST PULMONARY EMBOLISM    Result Date: 5/5/2022  Clinical history: PE protocol. Shortness of breath TECHNIQUE: Axial, coronal and sagittal CT of the chest with IV contrast. CT dose reduction was achieved through use of a standardized protocol tailored for this examination and automatic exposure control for dose modulation. COMPARISON: Chest x-ray earlier today. FINDINGS: There is no evidence of pulmonary embolism. The heart is enlarged. There is no pericardial effusion. There is coronary artery calcification. There is mitral annular calcification. The thoracic aorta is normal in course and caliber with atherosclerotic calcification. There is no lymphadenopathy. There is persistent elevation of the left hemidiaphragm. Patchy ill-defined densities in the left lung base, consistent with atelectasis. There is no focal pulmonary consolidation. No pneumothorax, pulmonary edema or pleural effusion. There is no endobronchial lesion. Included upper abdomen demonstrates micronodular contour of the liver. Prominence of the renal collecting system, appearing chronic. Bones are osteopenic. There are degenerative changes of the spine and the glenohumeral joints. 1. Negative for pulmonary embolism. 2. Small left lung base atelectasis. Negative for pneumonia, pulmonary edema or pleural effusion. 3. Cardiomegaly and coronary artery calcification.       All Micro Results     Procedure Component Value Units Date/Time    CULTURE, BLOOD [[id number]] Collected: 05/05/22 0419    Order Status: Completed Specimen: Blood Updated: 05/06/22 0807     Special Requests: --        LEFT  Antecubital       Culture result: NO GROWTH 1 DAY       CULTURE, BLOOD [[id number]] Collected: 05/05/22 0849    Order Status: Completed Specimen: Blood Updated: 05/06/22 0807     Special Requests: -- RIGHT  HAND       Culture result: NO GROWTH AFTER 22 HOURS       CULTURE, URINE [[id number]] Collected: 05/05/22 0459    Order Status: Completed Specimen: Clean catch Updated: 05/06/22 0644     Special Requests: NO SPECIAL REQUESTS        Culture result:       NO GROWTH AFTER SHORT PERIOD OF INCUBATION. FURTHER RESULTS TO FOLLOW AFTER OVERNIGHT INCUBATION. CULTURE, URINE [[id number]]     Order Status: Canceled Specimen: Clean catch     COVID-19 RAPID TEST [[id number]]  (Abnormal) Collected: 05/05/22 0419    Order Status: Completed Specimen: Nasopharyngeal Updated: 05/05/22 0525     Specimen source NASAL        COVID-19 rapid test Detected        Comment:      The specimen is POSITIVE for SARS-CoV-2, the novel coronavirus associated with COVID-19. This test has been authorized by the FDA under an Emergency Use Authorization (EUA) for use by authorized laboratories.         Fact sheet for Healthcare Providers: ConventionUpdate.co.nz  Fact sheet for Patients: ConventionUpdate.co.nz       Methodology: Isothermal Nucleic Acid Amplification               Labs: Results:       BMP, Mg, Phos Recent Labs     05/05/22 2312 05/05/22 0332    134*   K 4.2 4.1   * 103   CO2 24 25   AGAP 5* 6*   BUN 30* 40*   CREA 1.30* 1.70*   CA 8.3 8.5   * 175*   MG  --  2.2      CBC Recent Labs     05/05/22 2312 05/05/22 0332   WBC 7.6 10.7   RBC 2.68* 3.24*   HGB 8.2* 10.0*   HCT 25.8* 30.9*    238   GRANS 84* 88*   LYMPH 4* 2*   EOS 0* 0*   MONOS 11 8   BASOS 0 0   IG 1 1   ANEU 6.4 9.5*   ABL 0.3* 0.2*   BERNIE 0.0 0.0   ABM 0.9 0.9   ABB 0.0 0.0   AIG 0.1 0.1      LFT Recent Labs     05/05/22 0332   ALT 16   AP 79   TP 7.4   ALB 3.3   GLOB 4.1*   AGRAT 0.8*      Cardiac Testing Lab Results   Component Value Date/Time     06/04/2018 07:22 PM    Troponin-I, Qt. 0.02 06/04/2018 10:34 PM    Troponin-I, Qt. <0.02 (L) 06/04/2018 06:38 PM      Coagulation Tests Lab Results   Component Value Date/Time    Prothrombin time 10.5 09/22/2017 08:49 AM    INR 1.0 09/22/2017 08:49 AM      A1c Lab Results   Component Value Date/Time    Hemoglobin A1c 6.4 (H) 04/14/2022 10:31 AM    Hemoglobin A1c 6.0 (H) 10/21/2021 10:39 AM    Hemoglobin A1c 5.9 (H) 04/29/2021 09:56 AM      Lipid Panel Lab Results   Component Value Date/Time    Cholesterol, total 154 10/21/2021 10:39 AM    HDL Cholesterol 35 (L) 10/21/2021 10:39 AM    LDL, calculated 94 10/21/2021 10:39 AM    LDL, calculated 118 (H) 11/05/2019 08:27 AM    VLDL, calculated 25 10/21/2021 10:39 AM    VLDL, calculated 16 11/05/2019 08:27 AM    Triglyceride 139 10/21/2021 10:39 AM    CHOL/HDL Ratio 4.2 09/08/2017 10:41 AM      Thyroid Panel Lab Results   Component Value Date/Time    TSH 2.900 10/21/2021 10:39 AM    TSH 2.990 01/21/2021 09:07 AM        Most Recent UA Lab Results   Component Value Date/Time    Color YELLOW 05/05/2022 04:59 AM    Appearance CLOUDY 05/05/2022 04:59 AM    pH (UA) 6.0 05/05/2022 04:59 AM    Protein 30 (A) 05/05/2022 04:59 AM    Glucose Negative 05/05/2022 04:59 AM    Ketone Negative 05/05/2022 04:59 AM    Bilirubin Negative 05/05/2022 04:59 AM    Blood MODERATE (A) 05/05/2022 04:59 AM    Urobilinogen 0.2 05/05/2022 04:59 AM    Nitrites Positive (A) 05/05/2022 04:59 AM    Leukocyte Esterase LARGE (A) 05/05/2022 04:59 AM    WBC >100 05/05/2022 04:59 AM    RBC 0 05/05/2022 04:59 AM    Epithelial cells 0-3 05/05/2022 04:59 AM    Bacteria 4+ (H) 05/05/2022 04:59 AM    Casts 0 05/05/2022 04:59 AM    Crystals, urine 0 05/05/2022 04:59 AM    Mucus 0 05/05/2022 04:59 AM    Other observations RESULTS VERIFIED MANUALLY 06/04/2018 08:47 PM          All Labs from Last 24 Hrs:  Recent Results (from the past 24 hour(s))   TROPONIN-HIGH SENSITIVITY    Collection Time: 05/05/22  5:27 PM   Result Value Ref Range    Troponin-High Sensitivity 2,348.6 (HH) 0 - 14 pg/mL   METABOLIC PANEL, BASIC    Collection Time: 05/05/22 11:12 PM   Result Value Ref Range    Sodium 138 136 - 145 mmol/L    Potassium 4.2 3.5 - 5.1 mmol/L    Chloride 109 (H) 98 - 107 mmol/L    CO2 24 21 - 32 mmol/L    Anion gap 5 (L) 7 - 16 mmol/L    Glucose 149 (H) 65 - 100 mg/dL    BUN 30 (H) 8 - 23 MG/DL    Creatinine 1.30 (H) 0.6 - 1.0 MG/DL    GFR est AA 50 (L) >60 ml/min/1.73m2    GFR est non-AA 41 (L) >60 ml/min/1.73m2    Calcium 8.3 8.3 - 10.4 MG/DL   CBC WITH AUTOMATED DIFF    Collection Time: 05/05/22 11:12 PM   Result Value Ref Range    WBC 7.6 4.3 - 11.1 K/uL    RBC 2.68 (L) 4.05 - 5.2 M/uL    HGB 8.2 (L) 11.7 - 15.4 g/dL    HCT 25.8 (L) 35.8 - 46.3 %    MCV 96.3 79.6 - 97.8 FL    MCH 30.6 26.1 - 32.9 PG    MCHC 31.8 31.4 - 35.0 g/dL    RDW 13.4 11.9 - 14.6 %    PLATELET 213 421 - 908 K/uL    MPV 8.9 (L) 9.4 - 12.3 FL    ABSOLUTE NRBC 0.00 0.0 - 0.2 K/uL    DF AUTOMATED      NEUTROPHILS 84 (H) 43 - 78 %    LYMPHOCYTES 4 (L) 13 - 44 %    MONOCYTES 11 4.0 - 12.0 %    EOSINOPHILS 0 (L) 0.5 - 7.8 %    BASOPHILS 0 0.0 - 2.0 %    IMMATURE GRANULOCYTES 1 0.0 - 5.0 %    ABS. NEUTROPHILS 6.4 1.7 - 8.2 K/UL    ABS. LYMPHOCYTES 0.3 (L) 0.5 - 4.6 K/UL    ABS. MONOCYTES 0.9 0.1 - 1.3 K/UL    ABS. EOSINOPHILS 0.0 0.0 - 0.8 K/UL    ABS. BASOPHILS 0.0 0.0 - 0.2 K/UL    ABS. IMM.  GRANS. 0.1 0.0 - 0.5 K/UL   TROPONIN-HIGH SENSITIVITY    Collection Time: 05/05/22 11:12 PM   Result Value Ref Range    Troponin-High Sensitivity 1,880.3 (HH) 0 - 14 pg/mL   PLEASE READ & DOCUMENT PPD TEST IN 24 HRS    Collection Time: 05/06/22 12:00 AM   Result Value Ref Range    PPD Negative Negative    mm Induration 0 0 - 5 mm       Current Med List in Hospital:   Current Facility-Administered Medications   Medication Dose Route Frequency    sodium chloride (NS) flush 5-10 mL  5-10 mL IntraVENous Q8H    sodium chloride (NS) flush 5-10 mL  5-10 mL IntraVENous PRN    levothyroxine (SYNTHROID) tablet 88 mcg  88 mcg Oral DAILY    sodium chloride (NS) flush 5-40 mL  5-40 mL IntraVENous Q8H    sodium chloride (NS) flush 5-40 mL  5-40 mL IntraVENous PRN    acetaminophen (TYLENOL) tablet 650 mg  650 mg Oral Q6H PRN    Or    acetaminophen (TYLENOL) suppository 650 mg  650 mg Rectal Q6H PRN    polyethylene glycol (MIRALAX) packet 17 g  17 g Oral DAILY PRN    ondansetron (ZOFRAN ODT) tablet 4 mg  4 mg Oral Q8H PRN    Or    ondansetron (ZOFRAN) injection 4 mg  4 mg IntraVENous Q6H PRN    heparin (porcine) injection 5,000 Units  5,000 Units SubCUTAneous Q8H    cefTRIAXone (ROCEPHIN) 1 g in 0.9% sodium chloride (MBP/ADV) 50 mL MBP  1 g IntraVENous Q24H    0.9% sodium chloride infusion  75 mL/hr IntraVENous CONTINUOUS    isosorbide mononitrate ER (IMDUR) tablet 30 mg  30 mg Oral DAILY    metoprolol succinate (TOPROL-XL) XL tablet 25 mg  25 mg Oral DAILY    guaiFENesin-dextromethorphan (ROBITUSSIN DM) 100-10 mg/5 mL syrup 5 mL  5 mL Oral Q6H PRN       Allergies   Allergen Reactions    Lipitor [Atorvastatin] Other (comments)     Muscle aches    Penicillin G Rash     Immunization History   Administered Date(s) Administered    COVID-19, Moderna, Primary or Immunocompromised Series, MRNA, PF, 100mcg/0.5mL 04/09/2021, 05/07/2021    Influenza High Dose Vaccine PF 10/27/2016, 10/11/2017, 10/19/2018, 10/09/2019, 10/14/2020, 10/21/2021    Influenza Vaccine 10/04/2018    Pneumococcal Conjugate (PCV-13) 10/27/2016    Pneumococcal Polysaccharide (PPSV-23) 12/04/2018    TB Skin Test (PPD) Intradermal 05/05/2022    Tdap 10/12/2017    Zoster Recombinant 08/06/2019, 11/16/2019       Recent Vital Data:  Patient Vitals for the past 24 hrs:   Temp Pulse Resp BP SpO2   05/06/22 1440 100.2 °F (37.9 °C) 99 18 133/76 92 %   05/06/22 1210 98.1 °F (36.7 °C) 96 18 (!) 153/89 95 %   05/06/22 0807 98.1 °F (36.7 °C) 70 18 116/60 97 %   05/06/22 0411 97.5 °F (36.4 °C) 61 24 135/72 97 %   05/05/22 2340 98.3 °F (36.8 °C) 84 26 116/74 90 %   05/05/22 2122  100  (!) 163/86    05/05/22 2119 98.2 °F (36.8 °C) 94 (!) 34 (!) 189/166 90 %     Oxygen Therapy  O2 Sat (%): 92 % (05/06/22 1440)  Pulse via Oximetry: 97 beats per minute (05/05/22 0322)  O2 Device: Nasal cannula (05/06/22 0744)  O2 Flow Rate (L/min): 0.5 l/min (05/06/22 0744)    Estimated body mass index is 27.62 kg/m² as calculated from the following:    Height as of this encounter: 5' 2\" (1.575 m). Weight as of this encounter: 68.5 kg (151 lb). Intake/Output Summary (Last 24 hours) at 5/6/2022 1553  Last data filed at 5/6/2022 1410  Gross per 24 hour   Intake 1291 ml   Output 400 ml   Net 891 ml         Physical Exam:    General:    Frail elderly female, NAD  Head:  Normocephalic, atraumatic  Eyes:  Sclerae appear normal.  Pupils equally round. HENT:  Nares appear normal, no drainage. Moist mucous membranes  Neck:  Trachea midline  CV:   RRR. No m/r/g. No JVD  Lungs:   CTAB. Even, unlabored. No rales or wheeze  Abdomen:   Soft, nontender, nondistended. Extremities: Warm and dry. No edema. Skin:     No rashes. Normal coloration  Neuro:  CN II-XII grossly intact. Psych:  Normal mood and affect. Signed:  Young Croft DO    Part of this note may have been written by using a voice dictation software. The note has been proof read but may still contain some grammatical/other typographical errors.

## 2022-05-09 LAB
BACTERIA SPEC CULT: ABNORMAL
BACTERIA SPEC CULT: ABNORMAL
SERVICE CMNT-IMP: ABNORMAL

## 2022-05-10 LAB
BACTERIA SPEC CULT: NORMAL
BACTERIA SPEC CULT: NORMAL
SERVICE CMNT-IMP: NORMAL
SERVICE CMNT-IMP: NORMAL

## 2022-05-15 NOTE — PROGRESS NOTES
Physician Progress Note      Manjit Gardner  [facility] #:                  [id number]  :                       1935  ADMIT DATE:       2022 3:11 AM  DISCH DATE:        2022 4:12 PM  RESPONDING  PROVIDER #:        Ilir PRICE DO          QUERY TEXT:    Pt admitted with cough, sob. Pt noted to have Sepsis box marked YES in DC summary. If possible, please document in the progress notes and discharge summary if you are evaluating and /or treating any of the following: The medical record reflects the following:  Risk Factors: Tachycardia, COVID 19 positive  Clinical Indicators: WBC 10.7, HR 98  RR 25-34  Max T 100.2, Urine c/s positive for ecoli   CRP 13.7, Procalcitonin 3.62   Rapid COVID 19 postivie  Treatment:  Bolus 1L NS IV , NS 75/h, Decadron, Isolation, IV Rocephin   dc on po levaquin, Vit D  Thanks, [email or web address]  [phone]  Options provided:  -- Sepsis, present on admission  -- Sepsis related to COVID 19, present on admission  -- Sepsis was ruled out  -- Other - I will add my own diagnosis  -- Disagree - Not applicable / Not valid  -- Disagree - Clinically unable to determine / Unknown  -- Refer to Clinical Documentation Reviewer    PROVIDER RESPONSE TEXT:    This patient has sepsis which was present on admission. Query created by: Twyla Diana on 5/10/2022 3:46 PM      QUERY TEXT:    Pt admitted with SOB, cough. Pt noted to have urine c/s positive for ecoli. If possible, please document in the progress notes and discharge summary if you are evaluating and/or treating any of the following: The medical record reflects the following:  Risk Factors: C/S + ecoli  low grade T  tachypnea  Clinical Indicators: T 100.2, WBC 10.7  RR up to 34,  Procalcitoni 3.62 Urine c/s + ecoli  Treatment: IVF NS 75/h, IV Rocephin, dc on po levaquin  Thanks, [email or web address]  [phone]  Options provided:  -- Urinary Tract Infection (UTI)  -- Bacteriuria  -- Other - I will add my own diagnosis  -- Disagree - Not applicable / Not valid  -- Disagree - Clinically unable to determine / Unknown  -- Refer to Clinical Documentation Reviewer    PROVIDER RESPONSE TEXT:    This patient has a UTI.     Query created by: Vangie Carr on 5/10/2022 3:49 PM      Electronically signed by:  Shasha Hillman DO 5/15/2022 1:09 PM

## 2022-09-28 DIAGNOSIS — R73.03 PREDIABETES: ICD-10-CM

## 2022-09-28 DIAGNOSIS — N18.4 BENIGN HYPERTENSION WITH CKD (CHRONIC KIDNEY DISEASE) STAGE IV (HCC): Primary | ICD-10-CM

## 2022-09-28 DIAGNOSIS — E78.5 DYSLIPIDEMIA: ICD-10-CM

## 2022-09-28 DIAGNOSIS — I12.9 BENIGN HYPERTENSION WITH CKD (CHRONIC KIDNEY DISEASE) STAGE IV (HCC): Primary | ICD-10-CM

## 2022-09-28 DIAGNOSIS — E03.9 HYPOTHYROIDISM, ADULT: ICD-10-CM

## 2022-09-28 RX ORDER — ALBUTEROL SULFATE 90 UG/1
1 AEROSOL, METERED RESPIRATORY (INHALATION) EVERY 4 HOURS PRN
COMMUNITY
Start: 2022-05-06

## 2022-09-28 RX ORDER — ISOSORBIDE MONONITRATE 30 MG/1
30 TABLET, EXTENDED RELEASE ORAL DAILY
COMMUNITY
Start: 2022-05-07

## 2022-09-28 RX ORDER — FAMOTIDINE 20 MG/1
10 TABLET, FILM COATED ORAL 2 TIMES DAILY
COMMUNITY
Start: 2022-05-06

## 2022-09-28 RX ORDER — METOPROLOL SUCCINATE 25 MG/1
25 TABLET, EXTENDED RELEASE ORAL DAILY
COMMUNITY
Start: 2022-05-07

## 2022-09-28 RX ORDER — ONDANSETRON 8 MG/1
8 TABLET, ORALLY DISINTEGRATING ORAL EVERY 8 HOURS PRN
COMMUNITY
Start: 2022-05-06

## 2022-12-07 PROBLEM — I25.2 HISTORY OF MI (MYOCARDIAL INFARCTION): Status: ACTIVE | Noted: 2022-05-05

## 2022-12-07 NOTE — PROGRESS NOTES
Facundo Reyes (: 1935) is a 80 y.o. female, an established patient, is here for evaluation of the following chief complaint(s):  Chief Complaint   Patient presents with    Medicare AWV    Blood Sugar Problem     IFG Mgt    Results    Hyperlipidemia        ASSESSMENT/PLAN:  Jeffery Whiting was seen today for medicare awv, blood sugar problem, results and hyperlipidemia. Diagnoses and all orders for this visit:    Medicare annual wellness visit, subsequent    Benign hypertension with CKD (chronic kidney disease) stage III (HCC)  -     isosorbide mononitrate (IMDUR) 30 MG extended release tablet; Take 1 tablet by mouth daily  -     metoprolol succinate (TOPROL XL) 25 MG extended release tablet; Take 1 tablet by mouth daily    Hypothyroidism, adult  -     levothyroxine (SYNTHROID) 100 MCG tablet; Take 1 tablet by mouth daily    Dyslipidemia    Prediabetes    Osteopenia with high risk of fracture    Late onset Alzheimer's dementia without behavioral disturbance (HCC)    Unstable gait    Chronic atrial fibrillation (HCC)    History of MI (myocardial infarction)    Postmenopausal    Wheezing  -     albuterol sulfate HFA (PROVENTIL;VENTOLIN;PROAIR) 108 (90 Base) MCG/ACT inhaler; Inhale 1 puff into the lungs every 4 hours as needed for Wheezing or Shortness of Breath    Anxiety  -     citalopram (CELEXA) 10 MG tablet; Take 1 tablet by mouth daily  -     divalproex (DEPAKOTE) 125 MG DR tablet; Take 1 tablet by mouth daily  -     LORazepam (ATIVAN) 0.5 MG tablet; Take 1 tablet by mouth daily as needed for Anxiety for up to 90 days. Atherosclerosis of native coronary artery of native heart with angina pectoris (HCC)    Leg swelling  -     furosemide (LASIX) 20 MG tablet; Take 1 tablet by mouth daily    Other orders  -     nitroGLYCERIN (NITROSTAT) 0.4 MG SL tablet; Place 1 tablet under the tongue every 5 minutes as needed for Chest pain    Her TSH is elevated. Currently prescribed: Levothyroxine 88 mcg daily.   Will increase TSH to 100mcg and recheck TSH again in 6 weeks. Her current HgA1C is 5.8%. She is still prediabetic, but this is improving. Diet-controlled. Lipids are well controlled. Also diet-controlled. She has stage III CKD which is stable. Ms. Salazar Castillo was informed of the risks of various medications that can be harmful in CKD including, but not limited to, Motrin, Ibuprofen, Advil, Naprosyn and Aleve. Tylenol is ok (but do not exceed a total of 3000 mg/day). Also encouraged to limit/avoid salt in diet, exercise, and keeping lipids under control. Advised to drink 60 oz of water each day. BP today is: well controlled. Currently prescribed:   Key Anti-Hypertensive Meds            metoprolol succinate (TOPROL XL) 25 MG extended release tablet    Class: Historical Med    lisinopril-hydroCHLOROthiazide (PRINZIDE;ZESTORETIC) 20-12.5 MG per tablet    Class: Historical Med   Lisinopril/HCTZ was recently d/c'd at an ER visit. Was then started on isosorbide mononitrate 30 mg daily due to chest pain she was having while in the ER. No more chest pain at this time. Has separation anxiety now. Has ativan to take on a prn basis and is taking Depakote each day around 1-2pm and Celexa 10mg once daily. She has some mild pitting edema in BLE. Prescribed Lasix 20mg to take once QAM prn for swelling. Using albuterol prn for wheezing. She has sitters that come to her home 4 days/week. She is using a walker to ambulate (not new)--due to generalized weakness. She has osteopenia w/ a high risk of fracture. She does not wish to follow up on this any further and does not want any more testing for this. She is choosing at this time to stop seeing specialists as much as possible. States that she understands that she is declining in health and does not want all of the add'l office visits. I will try to manage all of her health care needs as much as possible.       On this date, I spent 32 minutes on this part of the visit reviewing previous notes, test results and face to face with the patient discussing the diagnosis and importance of compliance with the treatment plan as well as documenting on the day of the visit. Follow Up    6 weeks--recheck TSH (lab only)  6 mos HTN mgt w/ labs prior to visit      SUBJECTIVE/OBJECTIVE:  Cognitive issues have declined since having covid. Has separation anxiety now. Has ativan to take on a prn basis and is taking Depakote each day around 1-2pm.      Vitals:    12/08/22 1456   BP: 132/78   Weight: 160 lb (72.6 kg)   Height: 5' 2\" (1.575 m)      PHQ-9  12/8/2022 10/28/2021 7/8/2021   Little interest or pleasure in doing things 0 0 -   Little interest or pleasure in doing things - 0 0   Feeling down, depressed, or hopeless 0 0 -   PHQ-2 Score 0 0 -   Total Score PHQ 2 - 0 0   PHQ-9 Total Score 0 0 -          Orders Placed This Encounter    Cinnamon 500 MG CAPS     Sig: Take 500 mg by mouth    DISCONTD: citalopram (CELEXA) 10 MG tablet     Sig: Take 10 mg by mouth    DISCONTD: divalproex (DEPAKOTE) 125 MG DR tablet     Sig: Take 125 mg by mouth    DISCONTD: LORazepam (ATIVAN) 0.5 MG tablet     Sig: Take 0.5 mg by mouth.    polyethylene glycol (GLYCOLAX) 17 GM/SCOOP powder     Sig: Take 17 g by mouth daily    levothyroxine (SYNTHROID) 100 MCG tablet     Sig: Take 1 tablet by mouth daily     Dispense:  90 tablet     Refill:  0    albuterol sulfate HFA (PROVENTIL;VENTOLIN;PROAIR) 108 (90 Base) MCG/ACT inhaler     Sig: Inhale 1 puff into the lungs every 4 hours as needed for Wheezing or Shortness of Breath     Dispense:  18 g     Refill:  1     Please place rx on file until pt. Requests refill.     citalopram (CELEXA) 10 MG tablet     Sig: Take 1 tablet by mouth daily     Dispense:  90 tablet     Refill:  3    divalproex (DEPAKOTE) 125 MG DR tablet     Sig: Take 1 tablet by mouth daily     Dispense:  90 tablet     Refill:  3    isosorbide mononitrate (IMDUR) 30 MG extended release tablet     Sig: Take 1 tablet by mouth daily     Dispense:  90 tablet     Refill:  3    LORazepam (ATIVAN) 0.5 MG tablet     Sig: Take 1 tablet by mouth daily as needed for Anxiety for up to 90 days. Dispense:  30 tablet     Refill:  1    metoprolol succinate (TOPROL XL) 25 MG extended release tablet     Sig: Take 1 tablet by mouth daily     Dispense:  90 tablet     Refill:  3    nitroGLYCERIN (NITROSTAT) 0.4 MG SL tablet     Sig: Place 1 tablet under the tongue every 5 minutes as needed for Chest pain     Dispense:  25 tablet     Refill:  1     Please place rx on file until pt. Requests refill. furosemide (LASIX) 20 MG tablet     Sig: Take 1 tablet by mouth daily     Dispense:  60 tablet     Refill:  0       An electronic signature was used to authenticate this note. -- Sharry Leventhal, PA       Medicare Annual Wellness Visit    Amna Pena is here for Medicare AWV, Blood Sugar Problem (IFG Mgt), Results, and Hyperlipidemia    Assessment & Plan   Medicare annual wellness visit, subsequent  Benign hypertension with CKD (chronic kidney disease) stage III (HCC)  -     isosorbide mononitrate (IMDUR) 30 MG extended release tablet; Take 1 tablet by mouth daily, Disp-90 tablet, R-3Normal  -     metoprolol succinate (TOPROL XL) 25 MG extended release tablet; Take 1 tablet by mouth daily, Disp-90 tablet, R-3Normal  Hypothyroidism, adult  -     levothyroxine (SYNTHROID) 100 MCG tablet; Take 1 tablet by mouth daily, Disp-90 tablet, R-0Normal  Dyslipidemia  Prediabetes  Osteopenia with high risk of fracture  Late onset Alzheimer's dementia without behavioral disturbance (HCC)  Unstable gait  Chronic atrial fibrillation (HCC)  History of MI (myocardial infarction)  Postmenopausal  Wheezing  -     albuterol sulfate HFA (PROVENTIL;VENTOLIN;PROAIR) 108 (90 Base) MCG/ACT inhaler;  Inhale 1 puff into the lungs every 4 hours as needed for Wheezing or Shortness of Breath, Disp-18 g, R-1Please place rx on file until pt. Requests refill. Normal  Anxiety  -     citalopram (CELEXA) 10 MG tablet; Take 1 tablet by mouth daily, Disp-90 tablet, R-3Normal  -     divalproex (DEPAKOTE) 125 MG DR tablet; Take 1 tablet by mouth daily, Disp-90 tablet, R-3Normal  -     LORazepam (ATIVAN) 0.5 MG tablet; Take 1 tablet by mouth daily as needed for Anxiety for up to 90 days. , Disp-30 tablet, R-1Normal  Atherosclerosis of native coronary artery of native heart with angina pectoris (HCC)  Leg swelling  -     furosemide (LASIX) 20 MG tablet; Take 1 tablet by mouth daily, Disp-60 tablet, R-0Normal      Recommendations for Preventive Services Due: see orders and patient instructions/AVS.  Recommended screening schedule for the next 5-10 years is provided to the patient in written form: see Patient Instructions/AVS.     No follow-ups on file. Subjective   The following acute and/or chronic problems were also addressed today:  See above. Patient's complete Health Risk Assessment and screening values have been reviewed and are found in Flowsheets. The following problems were reviewed today and where indicated follow up appointments were made and/or referrals ordered. Positive Risk Factor Screenings with Interventions:    Fall Risk:  Do you feel unsteady or are you worried about falling? : (!) yes (Uses a walker and wheelchair)  2 or more falls in past year?: no     Interventions:    Patient declines any further evaluation or treatment    Cognitive:    Words recalled: 0 Words Recalled   Clock Drawing Test (CDT): (!) Abnormal   Total Score: (!) 0   Total Score Interpretation: Abnormal Mini-Cog      Interventions:  Patient declines any further evaluation or treatment                  ADL's:   Patient reports needing help with:  Select all that apply: (!) Bathing, Walking/Balance, Dressing  Select all that apply: [employer], Housekeeping, Banking/Finances, Shopping, Telephone Use, Food Preparation, Transportation, Taking Medications  Interventions:  Pt has assistants in her home 4 days/week an her daughter. See AVS for additional education material  See A/P for plan and any pertinent orders              Objective   Vitals:    12/08/22 1456   BP: 132/78   Weight: 160 lb (72.6 kg)   Height: 5' 2\" (1.575 m)      Body mass index is 29.26 kg/m². Allergies   Allergen Reactions    Atorvastatin Other (See Comments)     Muscle aches    Penicillin G Rash     Prior to Visit Medications    Medication Sig Taking? Authorizing Provider   Cinnamon 500 MG CAPS Take 500 mg by mouth Yes Historical Provider, MD   polyethylene glycol (GLYCOLAX) 17 GM/SCOOP powder Take 17 g by mouth daily Yes Historical Provider, MD   levothyroxine (SYNTHROID) 100 MCG tablet Take 1 tablet by mouth daily Yes GREG Agarwal   albuterol sulfate HFA (PROVENTIL;VENTOLIN;PROAIR) 108 (90 Base) MCG/ACT inhaler Inhale 1 puff into the lungs every 4 hours as needed for Wheezing or Shortness of Breath Yes GREG Agarwal   citalopram (CELEXA) 10 MG tablet Take 1 tablet by mouth daily Yes GREG Agarwal   divalproex (DEPAKOTE) 125 MG DR tablet Take 1 tablet by mouth daily Yes GREG Agarwal   isosorbide mononitrate (IMDUR) 30 MG extended release tablet Take 1 tablet by mouth daily Yes GREG Agarwal   LORazepam (ATIVAN) 0.5 MG tablet Take 1 tablet by mouth daily as needed for Anxiety for up to 90 days.  Yes GREG Agarwal   metoprolol succinate (TOPROL XL) 25 MG extended release tablet Take 1 tablet by mouth daily Yes GREG Agarwal   nitroGLYCERIN (NITROSTAT) 0.4 MG SL tablet Place 1 tablet under the tongue every 5 minutes as needed for Chest pain Yes GREG Agarwal   furosemide (LASIX) 20 MG tablet Take 1 tablet by mouth daily Yes GREG Agarwal   famotidine (PEPCID) 20 MG tablet Take 10 mg by mouth 2 times daily Yes Historical Provider, MD   acetaminophen (TYLENOL) 325 MG tablet Take 650 mg by mouth every 4 hours as needed Yes Ar Automatic Reconciliation   Cholecalciferol 50 MCG (2000 UT) CAPS Take 2,000 Units by mouth daily Yes Ar Automatic Reconciliation       CareTeam (Including outside providers/suppliers regularly involved in providing care):   Patient Care Team:  GREG Ibanez as PCP - General  GREG Ibanez as PCP - [facility]  GREG Ibanez as Physician Assistant     Reviewed and updated this visit:  Allergies

## 2022-12-08 ENCOUNTER — OFFICE VISIT (OUTPATIENT)
Dept: FAMILY MEDICINE CLINIC | Facility: CLINIC | Age: 87
End: 2022-12-08
Payer: MEDICARE

## 2022-12-08 VITALS
WEIGHT: 160 LBS | BODY MASS INDEX: 29.44 KG/M2 | DIASTOLIC BLOOD PRESSURE: 78 MMHG | SYSTOLIC BLOOD PRESSURE: 132 MMHG | HEIGHT: 62 IN

## 2022-12-08 DIAGNOSIS — F02.80 LATE ONSET ALZHEIMER'S DEMENTIA WITHOUT BEHAVIORAL DISTURBANCE (HCC): ICD-10-CM

## 2022-12-08 DIAGNOSIS — R06.2 WHEEZING: ICD-10-CM

## 2022-12-08 DIAGNOSIS — N18.30 BENIGN HYPERTENSION WITH CKD (CHRONIC KIDNEY DISEASE) STAGE III (HCC): ICD-10-CM

## 2022-12-08 DIAGNOSIS — M79.89 LEG SWELLING: ICD-10-CM

## 2022-12-08 DIAGNOSIS — E03.9 HYPOTHYROIDISM, ADULT: ICD-10-CM

## 2022-12-08 DIAGNOSIS — Z00.00 MEDICARE ANNUAL WELLNESS VISIT, SUBSEQUENT: Primary | ICD-10-CM

## 2022-12-08 DIAGNOSIS — G30.1 LATE ONSET ALZHEIMER'S DEMENTIA WITHOUT BEHAVIORAL DISTURBANCE (HCC): ICD-10-CM

## 2022-12-08 DIAGNOSIS — I12.9 BENIGN HYPERTENSION WITH CKD (CHRONIC KIDNEY DISEASE) STAGE III (HCC): ICD-10-CM

## 2022-12-08 DIAGNOSIS — E78.5 DYSLIPIDEMIA: ICD-10-CM

## 2022-12-08 DIAGNOSIS — R73.03 PREDIABETES: ICD-10-CM

## 2022-12-08 DIAGNOSIS — M85.80 OSTEOPENIA WITH HIGH RISK OF FRACTURE: ICD-10-CM

## 2022-12-08 DIAGNOSIS — F41.9 ANXIETY: ICD-10-CM

## 2022-12-08 DIAGNOSIS — Z78.0 POSTMENOPAUSAL: ICD-10-CM

## 2022-12-08 DIAGNOSIS — I48.20 CHRONIC ATRIAL FIBRILLATION (HCC): ICD-10-CM

## 2022-12-08 DIAGNOSIS — R26.81 UNSTABLE GAIT: ICD-10-CM

## 2022-12-08 DIAGNOSIS — I25.119 ATHEROSCLEROSIS OF NATIVE CORONARY ARTERY OF NATIVE HEART WITH ANGINA PECTORIS (HCC): ICD-10-CM

## 2022-12-08 DIAGNOSIS — I25.2 HISTORY OF MI (MYOCARDIAL INFARCTION): ICD-10-CM

## 2022-12-08 PROCEDURE — G8484 FLU IMMUNIZE NO ADMIN: HCPCS | Performed by: PHYSICIAN ASSISTANT

## 2022-12-08 PROCEDURE — G0439 PPPS, SUBSEQ VISIT: HCPCS | Performed by: PHYSICIAN ASSISTANT

## 2022-12-08 PROCEDURE — G8428 CUR MEDS NOT DOCUMENT: HCPCS | Performed by: PHYSICIAN ASSISTANT

## 2022-12-08 PROCEDURE — 1123F ACP DISCUSS/DSCN MKR DOCD: CPT | Performed by: PHYSICIAN ASSISTANT

## 2022-12-08 PROCEDURE — 4004F PT TOBACCO SCREEN RCVD TLK: CPT | Performed by: PHYSICIAN ASSISTANT

## 2022-12-08 PROCEDURE — 99214 OFFICE O/P EST MOD 30 MIN: CPT | Performed by: PHYSICIAN ASSISTANT

## 2022-12-08 PROCEDURE — 1090F PRES/ABSN URINE INCON ASSESS: CPT | Performed by: PHYSICIAN ASSISTANT

## 2022-12-08 PROCEDURE — G8417 CALC BMI ABV UP PARAM F/U: HCPCS | Performed by: PHYSICIAN ASSISTANT

## 2022-12-08 RX ORDER — NITROGLYCERIN 0.4 MG/1
0.4 TABLET SUBLINGUAL EVERY 5 MIN PRN
Qty: 25 TABLET | Refills: 1 | Status: SHIPPED | OUTPATIENT
Start: 2022-12-08

## 2022-12-08 RX ORDER — ALBUTEROL SULFATE 90 UG/1
1 AEROSOL, METERED RESPIRATORY (INHALATION) EVERY 4 HOURS PRN
Qty: 18 G | Refills: 1 | Status: SHIPPED | OUTPATIENT
Start: 2022-12-08

## 2022-12-08 RX ORDER — DIVALPROEX SODIUM 125 MG/1
125 TABLET, DELAYED RELEASE ORAL DAILY
Qty: 90 TABLET | Refills: 3 | Status: SHIPPED | OUTPATIENT
Start: 2022-12-08

## 2022-12-08 RX ORDER — POLYETHYLENE GLYCOL 3350 17 G/17G
17 POWDER, FOR SOLUTION ORAL DAILY
COMMUNITY
Start: 2019-04-18

## 2022-12-08 RX ORDER — CITALOPRAM 10 MG/1
10 TABLET ORAL DAILY
Qty: 90 TABLET | Refills: 3 | Status: SHIPPED | OUTPATIENT
Start: 2022-12-08

## 2022-12-08 RX ORDER — AMPICILLIN TRIHYDRATE 250 MG
500 CAPSULE ORAL
COMMUNITY

## 2022-12-08 RX ORDER — LEVOTHYROXINE SODIUM 0.1 MG/1
100 TABLET ORAL DAILY
Qty: 90 TABLET | Refills: 0 | Status: SHIPPED | OUTPATIENT
Start: 2022-12-08

## 2022-12-08 RX ORDER — CITALOPRAM 10 MG/1
10 TABLET ORAL
COMMUNITY
Start: 2022-11-26 | End: 2022-12-08 | Stop reason: SDUPTHER

## 2022-12-08 RX ORDER — LORAZEPAM 0.5 MG/1
0.5 TABLET ORAL
COMMUNITY
Start: 2022-10-25 | End: 2022-12-08 | Stop reason: SDUPTHER

## 2022-12-08 RX ORDER — FUROSEMIDE 20 MG/1
20 TABLET ORAL DAILY
Qty: 60 TABLET | Refills: 0 | Status: SHIPPED | OUTPATIENT
Start: 2022-12-08

## 2022-12-08 RX ORDER — METOPROLOL SUCCINATE 25 MG/1
25 TABLET, EXTENDED RELEASE ORAL DAILY
Qty: 90 TABLET | Refills: 3 | Status: SHIPPED | OUTPATIENT
Start: 2022-12-08

## 2022-12-08 RX ORDER — ISOSORBIDE MONONITRATE 30 MG/1
30 TABLET, EXTENDED RELEASE ORAL DAILY
Qty: 90 TABLET | Refills: 3 | Status: SHIPPED | OUTPATIENT
Start: 2022-12-08

## 2022-12-08 RX ORDER — LORAZEPAM 0.5 MG/1
0.5 TABLET ORAL DAILY PRN
Qty: 30 TABLET | Refills: 1 | Status: SHIPPED | OUTPATIENT
Start: 2022-12-08 | End: 2023-03-08

## 2022-12-08 RX ORDER — DIVALPROEX SODIUM 125 MG/1
125 TABLET, DELAYED RELEASE ORAL
COMMUNITY
Start: 2022-11-03 | End: 2022-12-08 | Stop reason: SDUPTHER

## 2022-12-08 ASSESSMENT — PATIENT HEALTH QUESTIONNAIRE - PHQ9
SUM OF ALL RESPONSES TO PHQ QUESTIONS 1-9: 0
SUM OF ALL RESPONSES TO PHQ9 QUESTIONS 1 & 2: 0
1. LITTLE INTEREST OR PLEASURE IN DOING THINGS: 0
SUM OF ALL RESPONSES TO PHQ QUESTIONS 1-9: 0
SUM OF ALL RESPONSES TO PHQ QUESTIONS 1-9: 0
2. FEELING DOWN, DEPRESSED OR HOPELESS: 0
SUM OF ALL RESPONSES TO PHQ QUESTIONS 1-9: 0

## 2022-12-08 ASSESSMENT — LIFESTYLE VARIABLES: HOW OFTEN DO YOU HAVE A DRINK CONTAINING ALCOHOL: NEVER

## 2023-01-03 DIAGNOSIS — E03.9 HYPOTHYROIDISM, ADULT: Primary | ICD-10-CM

## 2023-01-07 NOTE — PROGRESS NOTES
Darrius Reyes (: 1935) is a 80 y.o. female, an established patient, is here for evaluation of the following chief complaint(s):  Chief Complaint   Patient presents with    Wheezing          ASSESSMENT/PLAN:  Eric Banda was seen today for wheezing. Diagnoses and all orders for this visit:    Bronchitis with bronchospasm  -     budesonide (PULMICORT) 0.25 MG/2ML nebulizer suspension; Take 2 mLs by nebulization 2 times daily        Rev'd UC note from [facility], dated 23. Treated for acute bronchitis w/ bronchospasm & tx'd w/ DuoNeb +Doxy +Pred. Pt is still wheezing. Just took last dose of Prednisone this morning and is still taking Doxycycline (1 dose left). --will treat with Pulmicort respules 2.5mg use w/ nebulizer BID. Rx provided to get a nebulizer from DME supplier. BP is elevated today--likely secondary to shortness of breath and slightly labored breathing. Follow Up    Return for --as scheduled. --or sooner if symptoms do not improve. SUBJECTIVE/OBJECTIVE:  Was seen at [facility] a week ago today for wheezing. Vitals:    23 1558   BP: (!) 161/97   Pulse: 86   Resp: 20   Temp: 97.8 °F (36.6 °C)   TempSrc: Oral   SpO2: 95%   Weight: 153 lb (69.4 kg)   Height: 5' 2\" (1.575 m)          Orders Placed This Encounter    ALBUTEROL IN     Si puff AS NEEDED EVERY 4 HOURS (route: inhalation)    budesonide (PULMICORT) 0.25 MG/2ML nebulizer suspension     Sig: Take 2 mLs by nebulization 2 times daily     Dispense:  60 each     Refill:  0               An electronic signature was used to authenticate this note.   -- GREG Bueno

## 2023-01-09 ENCOUNTER — OFFICE VISIT (OUTPATIENT)
Dept: FAMILY MEDICINE CLINIC | Facility: CLINIC | Age: 88
End: 2023-01-09
Payer: MEDICARE

## 2023-01-09 VITALS
OXYGEN SATURATION: 95 % | BODY MASS INDEX: 28.16 KG/M2 | HEIGHT: 62 IN | HEART RATE: 86 BPM | TEMPERATURE: 97.8 F | WEIGHT: 153 LBS | RESPIRATION RATE: 20 BRPM | DIASTOLIC BLOOD PRESSURE: 97 MMHG | SYSTOLIC BLOOD PRESSURE: 161 MMHG

## 2023-01-09 DIAGNOSIS — J20.9 BRONCHITIS WITH BRONCHOSPASM: Primary | ICD-10-CM

## 2023-01-09 PROCEDURE — 99214 OFFICE O/P EST MOD 30 MIN: CPT | Performed by: PHYSICIAN ASSISTANT

## 2023-01-09 PROCEDURE — G8417 CALC BMI ABV UP PARAM F/U: HCPCS | Performed by: PHYSICIAN ASSISTANT

## 2023-01-09 PROCEDURE — G8484 FLU IMMUNIZE NO ADMIN: HCPCS | Performed by: PHYSICIAN ASSISTANT

## 2023-01-09 PROCEDURE — 1036F TOBACCO NON-USER: CPT | Performed by: PHYSICIAN ASSISTANT

## 2023-01-09 PROCEDURE — G8428 CUR MEDS NOT DOCUMENT: HCPCS | Performed by: PHYSICIAN ASSISTANT

## 2023-01-09 PROCEDURE — 1123F ACP DISCUSS/DSCN MKR DOCD: CPT | Performed by: PHYSICIAN ASSISTANT

## 2023-01-09 PROCEDURE — 1090F PRES/ABSN URINE INCON ASSESS: CPT | Performed by: PHYSICIAN ASSISTANT

## 2023-01-09 RX ORDER — BUDESONIDE 0.25 MG/2ML
250 INHALANT ORAL 2 TIMES DAILY
Qty: 60 EACH | Refills: 0 | Status: SHIPPED | OUTPATIENT
Start: 2023-01-09

## 2023-01-13 ENCOUNTER — TELEPHONE (OUTPATIENT)
Dept: FAMILY MEDICINE CLINIC | Facility: CLINIC | Age: 88
End: 2023-01-13

## 2023-01-13 DIAGNOSIS — G30.1 LATE ONSET ALZHEIMER'S DEMENTIA WITHOUT BEHAVIORAL DISTURBANCE (HCC): Primary | ICD-10-CM

## 2023-01-13 DIAGNOSIS — R53.81 DECLINING FUNCTIONAL STATUS: ICD-10-CM

## 2023-01-13 DIAGNOSIS — F03.911 AGITATION DUE TO DEMENTIA: ICD-10-CM

## 2023-01-13 DIAGNOSIS — F02.80 LATE ONSET ALZHEIMER'S DEMENTIA WITHOUT BEHAVIORAL DISTURBANCE (HCC): Primary | ICD-10-CM

## 2023-01-13 DIAGNOSIS — R54 AGE-RELATED PHYSICAL DEBILITY: ICD-10-CM

## 2023-01-13 NOTE — TELEPHONE ENCOUNTER
Pt's daughter Alysa Andersen) called asking that Hospice be started ASAP. Pt is very agitated and crying for \"her mom\". Ativan helping some. Referral requested for [facility]. Their fax# is [phone].

## 2023-01-31 NOTE — ED TRIAGE NOTES
Patient arrives via EMS from home with shortness of breath/cough. States cough has been on going for about 3 days, coughing up white phlegm. States shortness of breath has been on going for about 2 hours. EMS states initial O2 sat was 90% placed on 4L with improvement to 96%. Patient alert and oriented to baseline (person).
What Type Of Note Output Would You Prefer (Optional)?: Standard Output
What Is The Reason For Today's Visit?: Full Body Skin Examination
What Is The Reason For Today's Visit? (Being Monitored For X): the development of a new lesion
How Severe Are Your Spot(S)?: mild

## 2023-11-20 NOTE — PATIENT INSTRUCTIONS
Personalized Preventive Plan for Gayatri Cross - 12/8/2022  Medicare offers a range of preventive health benefits. Some of the tests and screenings are paid in full while other may be subject to a deductible, co-insurance, and/or copay. Some of these benefits include a comprehensive review of your medical history including lifestyle, illnesses that may run in your family, and various assessments and screenings as appropriate. After reviewing your medical record and screening and assessments performed today your provider may have ordered immunizations, labs, imaging, and/or referrals for you. A list of these orders (if applicable) as well as your Preventive Care list are included within your After Visit Summary for your review. Other Preventive Recommendations:    A preventive eye exam performed by an eye specialist is recommended every 1-2 years to screen for glaucoma; cataracts, macular degeneration, and other eye disorders. A preventive dental visit is recommended every 6 months. Try to get at least 150 minutes of exercise per week or 10,000 steps per day on a pedometer . Order or download the FREE \"Exercise & Physical Activity: Your Everyday Guide\" from [employer]. Call 3-429.155.6012 or search [employer] online. You need 5181-2673 mg of calcium and 6975-9436 IU of vitamin D per day. It is possible to meet your calcium requirement with diet alone, but a vitamin D supplement is usually necessary to meet this goal.  When exposed to the sun, use a sunscreen that protects against both UVA and UVB radiation with an SPF of 30 or greater. Reapply every 2 to 3 hours or after sweating, drying off with a towel, or swimming. Always wear a seat belt when traveling in a car. Always wear a helmet when riding a bicycle or motorcycle.
FAMILY HISTORY:  FH: kidney disease, Kidney mass - Sister